# Patient Record
Sex: MALE | Race: WHITE | HISPANIC OR LATINO | Employment: UNEMPLOYED | URBAN - NONMETROPOLITAN AREA
[De-identification: names, ages, dates, MRNs, and addresses within clinical notes are randomized per-mention and may not be internally consistent; named-entity substitution may affect disease eponyms.]

---

## 2019-02-07 ENCOUNTER — NON-PROVIDER VISIT (OUTPATIENT)
Dept: URGENT CARE | Facility: PHYSICIAN GROUP | Age: 34
End: 2019-02-07
Payer: MEDICAID

## 2019-02-07 PROCEDURE — 8907 PR URINE COLLECT ONLY: Performed by: NURSE PRACTITIONER

## 2020-01-01 ENCOUNTER — APPOINTMENT (OUTPATIENT)
Dept: RADIOLOGY | Facility: MEDICAL CENTER | Age: 35
DRG: 432 | End: 2020-01-01
Attending: INTERNAL MEDICINE
Payer: MEDICAID

## 2020-01-01 ENCOUNTER — PATIENT OUTREACH (OUTPATIENT)
Dept: HEALTH INFORMATION MANAGEMENT | Facility: OTHER | Age: 35
End: 2020-01-01

## 2020-01-01 ENCOUNTER — OFFICE VISIT (OUTPATIENT)
Dept: URGENT CARE | Facility: PHYSICIAN GROUP | Age: 35
End: 2020-01-01
Payer: MEDICAID

## 2020-01-01 ENCOUNTER — APPOINTMENT (OUTPATIENT)
Dept: RADIOLOGY | Facility: MEDICAL CENTER | Age: 35
DRG: 432 | End: 2020-01-01
Attending: EMERGENCY MEDICINE
Payer: MEDICAID

## 2020-01-01 ENCOUNTER — HOSPITAL ENCOUNTER (OUTPATIENT)
Dept: RADIOLOGY | Facility: MEDICAL CENTER | Age: 35
End: 2020-10-19
Payer: MEDICAID

## 2020-01-01 ENCOUNTER — APPOINTMENT (OUTPATIENT)
Dept: RADIOLOGY | Facility: MEDICAL CENTER | Age: 35
DRG: 432 | End: 2020-01-01
Attending: STUDENT IN AN ORGANIZED HEALTH CARE EDUCATION/TRAINING PROGRAM
Payer: MEDICAID

## 2020-01-01 ENCOUNTER — HOSPITAL ENCOUNTER (INPATIENT)
Facility: MEDICAL CENTER | Age: 35
LOS: 1 days | DRG: 432 | End: 2020-10-06
Attending: EMERGENCY MEDICINE | Admitting: HOSPITALIST
Payer: MEDICAID

## 2020-01-01 ENCOUNTER — HOSPITAL ENCOUNTER (INPATIENT)
Facility: MEDICAL CENTER | Age: 35
LOS: 1 days | DRG: 432 | End: 2020-10-20
Attending: EMERGENCY MEDICINE | Admitting: INTERNAL MEDICINE
Payer: MEDICAID

## 2020-01-01 ENCOUNTER — APPOINTMENT (OUTPATIENT)
Dept: RADIOLOGY | Facility: MEDICAL CENTER | Age: 35
DRG: 432 | End: 2020-01-01
Payer: MEDICAID

## 2020-01-01 ENCOUNTER — HOSPITAL ENCOUNTER (OUTPATIENT)
Dept: RADIOLOGY | Facility: MEDICAL CENTER | Age: 35
End: 2020-10-20
Attending: INTERNAL MEDICINE
Payer: MEDICAID

## 2020-01-01 VITALS
TEMPERATURE: 98.3 F | WEIGHT: 213 LBS | BODY MASS INDEX: 31.55 KG/M2 | DIASTOLIC BLOOD PRESSURE: 95 MMHG | HEIGHT: 69 IN | HEART RATE: 106 BPM | SYSTOLIC BLOOD PRESSURE: 150 MMHG | OXYGEN SATURATION: 97 % | RESPIRATION RATE: 14 BRPM

## 2020-01-01 VITALS
DIASTOLIC BLOOD PRESSURE: 59 MMHG | SYSTOLIC BLOOD PRESSURE: 103 MMHG | WEIGHT: 272.49 LBS | OXYGEN SATURATION: 85 % | TEMPERATURE: 100 F | HEIGHT: 70 IN | RESPIRATION RATE: 26 BRPM | HEART RATE: 98 BPM | BODY MASS INDEX: 39.01 KG/M2

## 2020-01-01 VITALS
SYSTOLIC BLOOD PRESSURE: 139 MMHG | WEIGHT: 210.32 LBS | BODY MASS INDEX: 30.11 KG/M2 | HEART RATE: 124 BPM | OXYGEN SATURATION: 98 % | TEMPERATURE: 99.5 F | HEIGHT: 70 IN | RESPIRATION RATE: 18 BRPM | DIASTOLIC BLOOD PRESSURE: 93 MMHG

## 2020-01-01 DIAGNOSIS — R29.898 WEAKNESS OF BOTH LOWER EXTREMITIES: ICD-10-CM

## 2020-01-01 DIAGNOSIS — R03.0 ELEVATED BLOOD PRESSURE READING: ICD-10-CM

## 2020-01-01 DIAGNOSIS — K76.7 HEPATORENAL FAILURE (HCC): ICD-10-CM

## 2020-01-01 DIAGNOSIS — K92.2 UPPER GI BLEED: ICD-10-CM

## 2020-01-01 DIAGNOSIS — D62 ACUTE BLOOD LOSS ANEMIA: ICD-10-CM

## 2020-01-01 DIAGNOSIS — K70.30 ALCOHOLIC CIRRHOSIS OF LIVER WITHOUT ASCITES (HCC): ICD-10-CM

## 2020-01-01 DIAGNOSIS — R57.8 HEMORRHAGIC SHOCK (HCC): ICD-10-CM

## 2020-01-01 DIAGNOSIS — J96.00 ACUTE RESPIRATORY FAILURE, UNSPECIFIED WHETHER WITH HYPOXIA OR HYPERCAPNIA (HCC): ICD-10-CM

## 2020-01-01 DIAGNOSIS — K72.10 END STAGE LIVER DISEASE (HCC): ICD-10-CM

## 2020-01-01 DIAGNOSIS — R04.0 EPISTAXIS: ICD-10-CM

## 2020-01-01 LAB
ABO + RH BLD: NORMAL
ABO GROUP BLD: NORMAL
ACTION RANGE TRIGGERED IACRT: NO
ACTION RANGE TRIGGERED IACRT: YES
ALBUMIN SERPL BCP-MCNC: 1 G/DL (ref 3.2–4.9)
ALBUMIN SERPL BCP-MCNC: 1.3 G/DL (ref 3.2–4.9)
ALBUMIN SERPL BCP-MCNC: 1.6 G/DL (ref 3.2–4.9)
ALBUMIN SERPL BCP-MCNC: 2.2 G/DL (ref 3.2–4.9)
ALBUMIN SERPL BCP-MCNC: 2.5 G/DL (ref 3.2–4.9)
ALBUMIN/GLOB SERPL: 0.6 G/DL
ALBUMIN/GLOB SERPL: 0.6 G/DL
ALBUMIN/GLOB SERPL: 1.1 G/DL
ALBUMIN/GLOB SERPL: ABNORMAL G/DL
ALBUMIN/GLOB SERPL: ABNORMAL G/DL
ALP SERPL-CCNC: 105 U/L (ref 30–99)
ALP SERPL-CCNC: 105 U/L (ref 30–99)
ALP SERPL-CCNC: 120 U/L (ref 30–99)
ALP SERPL-CCNC: 153 U/L (ref 30–99)
ALP SERPL-CCNC: 83 U/L (ref 30–99)
ALT SERPL-CCNC: 118 U/L (ref 2–50)
ALT SERPL-CCNC: 22 U/L (ref 2–50)
ALT SERPL-CCNC: 26 U/L (ref 2–50)
ALT SERPL-CCNC: 2879 U/L (ref 2–50)
ALT SERPL-CCNC: 79 U/L (ref 2–50)
AMMONIA PLAS-SCNC: 562 UMOL/L (ref 11–45)
AMMONIA PLAS-SCNC: 74 UMOL/L (ref 11–45)
AMORPH CRY #/AREA URNS HPF: PRESENT /HPF
AMPHET UR QL SCN: NEGATIVE
ANION GAP SERPL CALC-SCNC: 11 MMOL/L (ref 7–16)
ANION GAP SERPL CALC-SCNC: 12 MMOL/L (ref 7–16)
ANION GAP SERPL CALC-SCNC: 14 MMOL/L (ref 7–16)
ANION GAP SERPL CALC-SCNC: 15 MMOL/L (ref 7–16)
ANION GAP SERPL CALC-SCNC: 16 MMOL/L (ref 7–16)
ANION GAP SERPL CALC-SCNC: 16 MMOL/L (ref 7–16)
ANION GAP SERPL CALC-SCNC: 21 MMOL/L (ref 7–16)
ANION GAP SERPL CALC-SCNC: 25 MMOL/L (ref 7–16)
ANISOCYTOSIS BLD QL SMEAR: ABNORMAL
APAP SERPL-MCNC: <5 UG/ML (ref 10–30)
APPEARANCE UR: ABNORMAL
APTT PPP: 63.6 SEC (ref 24.7–36)
APTT PPP: 94.4 SEC (ref 24.7–36)
AST SERPL-CCNC: 147 U/L (ref 12–45)
AST SERPL-CCNC: 169 U/L (ref 12–45)
AST SERPL-CCNC: 303 U/L (ref 12–45)
AST SERPL-CCNC: 469 U/L (ref 12–45)
AST SERPL-CCNC: >7000 U/L (ref 12–45)
BACTERIA #/AREA URNS HPF: ABNORMAL /HPF
BARBITURATES UR QL SCN: NEGATIVE
BARCODED ABORH UBTYP: 5100
BARCODED ABORH UBTYP: 6200
BARCODED ABORH UBTYP: 7300
BARCODED ABORH UBTYP: 8400
BARCODED PRD CODE UBPRD: NORMAL
BARCODED UNIT NUM UBUNT: NORMAL
BASE EXCESS BLDA CALC-SCNC: -13 MMOL/L (ref -4–3)
BASE EXCESS BLDA CALC-SCNC: -13 MMOL/L (ref -4–3)
BASE EXCESS BLDA CALC-SCNC: -15 MMOL/L (ref -4–3)
BASE EXCESS BLDA CALC-SCNC: -16 MMOL/L (ref -4–3)
BASE EXCESS BLDA CALC-SCNC: -17 MMOL/L (ref -4–3)
BASE EXCESS BLDA CALC-SCNC: -19 MMOL/L (ref -4–3)
BASE EXCESS BLDA CALC-SCNC: -20 MMOL/L (ref -4–3)
BASOPHILS # BLD AUTO: 0.4 % (ref 0–1.8)
BASOPHILS # BLD AUTO: 0.4 % (ref 0–1.8)
BASOPHILS # BLD AUTO: 0.9 % (ref 0–1.8)
BASOPHILS # BLD AUTO: 0.9 % (ref 0–1.8)
BASOPHILS # BLD AUTO: 1.8 % (ref 0–1.8)
BASOPHILS # BLD: 0.03 K/UL (ref 0–0.12)
BASOPHILS # BLD: 0.04 K/UL (ref 0–0.12)
BASOPHILS # BLD: 0.11 K/UL (ref 0–0.12)
BASOPHILS # BLD: 0.16 K/UL (ref 0–0.12)
BASOPHILS # BLD: 0.37 K/UL (ref 0–0.12)
BENZODIAZ UR QL SCN: NEGATIVE
BILIRUB CONJ SERPL-MCNC: 9.3 MG/DL (ref 0.1–0.5)
BILIRUB SERPL-MCNC: 12 MG/DL (ref 0.1–1.5)
BILIRUB SERPL-MCNC: 12.8 MG/DL (ref 0.1–1.5)
BILIRUB SERPL-MCNC: 13.2 MG/DL (ref 0.1–1.5)
BILIRUB SERPL-MCNC: 17 MG/DL (ref 0.1–1.5)
BILIRUB SERPL-MCNC: 17.2 MG/DL (ref 0.1–1.5)
BILIRUB UR QL STRIP.AUTO: ABNORMAL
BLD GP AB SCN SERPL QL: NORMAL
BLD GP AB SCN SERPL QL: NORMAL
BODY TEMPERATURE: ABNORMAL DEGREES
BUN SERPL-MCNC: 16 MG/DL (ref 8–22)
BUN SERPL-MCNC: 20 MG/DL (ref 8–22)
BUN SERPL-MCNC: 21 MG/DL (ref 8–22)
BUN SERPL-MCNC: 77 MG/DL (ref 8–22)
BUN SERPL-MCNC: 78 MG/DL (ref 8–22)
BUN SERPL-MCNC: 79 MG/DL (ref 8–22)
BUN SERPL-MCNC: 80 MG/DL (ref 8–22)
BUN SERPL-MCNC: 90 MG/DL (ref 8–22)
BURR CELLS BLD QL SMEAR: NORMAL
BZE UR QL SCN: NEGATIVE
CALCIUM SERPL-MCNC: 6 MG/DL (ref 8.5–10.5)
CALCIUM SERPL-MCNC: 6.3 MG/DL (ref 8.5–10.5)
CALCIUM SERPL-MCNC: 7.2 MG/DL (ref 8.5–10.5)
CALCIUM SERPL-MCNC: 7.2 MG/DL (ref 8.5–10.5)
CALCIUM SERPL-MCNC: 7.4 MG/DL (ref 8.5–10.5)
CALCIUM SERPL-MCNC: 7.4 MG/DL (ref 8.5–10.5)
CALCIUM SERPL-MCNC: 7.7 MG/DL (ref 8.5–10.5)
CALCIUM SERPL-MCNC: 8 MG/DL (ref 8.5–10.5)
CANNABINOIDS UR QL SCN: NEGATIVE
CFT BLD TEG: 8.1 MIN (ref 5–10)
CHLORIDE SERPL-SCNC: 101 MMOL/L (ref 96–112)
CHLORIDE SERPL-SCNC: 102 MMOL/L (ref 96–112)
CHLORIDE SERPL-SCNC: 104 MMOL/L (ref 96–112)
CHLORIDE SERPL-SCNC: 89 MMOL/L (ref 96–112)
CHLORIDE SERPL-SCNC: 92 MMOL/L (ref 96–112)
CHLORIDE SERPL-SCNC: 95 MMOL/L (ref 96–112)
CHLORIDE SERPL-SCNC: 96 MMOL/L (ref 96–112)
CHLORIDE SERPL-SCNC: 98 MMOL/L (ref 96–112)
CHLORIDE UR-SCNC: <20 MMOL/L
CHOLEST SERPL-MCNC: 82 MG/DL (ref 100–199)
CLOT ANGLE BLD TEG: 61.1 DEGREES (ref 53–72)
CLOT LYSIS 30M P MA LENFR BLD TEG: 0 % (ref 0–8)
CO2 BLDA-SCNC: 12 MMOL/L (ref 20–33)
CO2 BLDA-SCNC: 13 MMOL/L (ref 20–33)
CO2 BLDA-SCNC: 17 MMOL/L (ref 20–33)
CO2 BLDA-SCNC: 18 MMOL/L (ref 20–33)
CO2 BLDA-SCNC: <10 MMOL/L (ref 20–33)
CO2 BLDA-SCNC: <10 MMOL/L (ref 20–33)
CO2 SERPL-SCNC: 11 MMOL/L (ref 20–33)
CO2 SERPL-SCNC: 12 MMOL/L (ref 20–33)
CO2 SERPL-SCNC: 14 MMOL/L (ref 20–33)
CO2 SERPL-SCNC: 18 MMOL/L (ref 20–33)
CO2 SERPL-SCNC: 20 MMOL/L (ref 20–33)
CO2 SERPL-SCNC: 7 MMOL/L (ref 20–33)
COLOR UR: ABNORMAL
COMPONENT CT 8504CT: NORMAL
COMPONENT F 8504F: NORMAL
COMPONENT FT 8504FT: NORMAL
COMPONENT P 8504P: NORMAL
COMPONENT R 8504R: NORMAL
COVID ORDER STATUS COVID19: NORMAL
COVID ORDER STATUS COVID19: NORMAL
CREAT SERPL-MCNC: 0.4 MG/DL (ref 0.5–1.4)
CREAT SERPL-MCNC: 1.44 MG/DL (ref 0.5–1.4)
CREAT SERPL-MCNC: 1.97 MG/DL (ref 0.5–1.4)
CREAT SERPL-MCNC: 3.77 MG/DL (ref 0.5–1.4)
CREAT SERPL-MCNC: 4.04 MG/DL (ref 0.5–1.4)
CREAT SERPL-MCNC: 4.1 MG/DL (ref 0.5–1.4)
CREAT SERPL-MCNC: 4.24 MG/DL (ref 0.5–1.4)
CREAT SERPL-MCNC: 4.51 MG/DL (ref 0.5–1.4)
CREAT UR-MCNC: 237.22 MG/DL
CREAT UR-MCNC: 380.24 MG/DL
CT.EXTRINSIC BLD ROTEM: 2.2 MIN (ref 1–3)
EKG IMPRESSION: NORMAL
EOSINOPHIL # BLD AUTO: 0 K/UL (ref 0–0.51)
EOSINOPHIL # BLD AUTO: 0.02 K/UL (ref 0–0.51)
EOSINOPHIL # BLD AUTO: 0.02 K/UL (ref 0–0.51)
EOSINOPHIL # BLD AUTO: 0.11 K/UL (ref 0–0.51)
EOSINOPHIL # BLD AUTO: 0.3 K/UL (ref 0–0.51)
EOSINOPHIL NFR BLD: 0 % (ref 0–6.9)
EOSINOPHIL NFR BLD: 0.2 % (ref 0–6.9)
EOSINOPHIL NFR BLD: 0.2 % (ref 0–6.9)
EOSINOPHIL NFR BLD: 0.9 % (ref 0–6.9)
EOSINOPHIL NFR BLD: 1.7 % (ref 0–6.9)
EPI CELLS #/AREA URNS HPF: NEGATIVE /HPF
ERYTHROCYTE [DISTWIDTH] IN BLOOD BY AUTOMATED COUNT: 45.6 FL (ref 35.9–50)
ERYTHROCYTE [DISTWIDTH] IN BLOOD BY AUTOMATED COUNT: 46.1 FL (ref 35.9–50)
ERYTHROCYTE [DISTWIDTH] IN BLOOD BY AUTOMATED COUNT: 46.4 FL (ref 35.9–50)
ERYTHROCYTE [DISTWIDTH] IN BLOOD BY AUTOMATED COUNT: 48.2 FL (ref 35.9–50)
ERYTHROCYTE [DISTWIDTH] IN BLOOD BY AUTOMATED COUNT: 48.9 FL (ref 35.9–50)
ERYTHROCYTE [DISTWIDTH] IN BLOOD BY AUTOMATED COUNT: 51.3 FL (ref 35.9–50)
ERYTHROCYTE [DISTWIDTH] IN BLOOD BY AUTOMATED COUNT: 51.6 FL (ref 35.9–50)
ERYTHROCYTE [DISTWIDTH] IN BLOOD BY AUTOMATED COUNT: 57.6 FL (ref 35.9–50)
ERYTHROCYTE [DISTWIDTH] IN BLOOD BY AUTOMATED COUNT: 57.8 FL (ref 35.9–50)
ERYTHROCYTE [DISTWIDTH] IN BLOOD BY AUTOMATED COUNT: 61 FL (ref 35.9–50)
ERYTHROCYTE [DISTWIDTH] IN BLOOD BY AUTOMATED COUNT: 65.1 FL (ref 35.9–50)
ETHANOL BLD-MCNC: 327.6 MG/DL (ref 0–10.1)
FERRITIN SERPL-MCNC: 354 NG/ML (ref 22–322)
FIBRINOGEN PPP-MCNC: 126 MG/DL (ref 215–460)
FIBRINOGEN PPP-MCNC: 142 MG/DL (ref 215–460)
FOLATE SERPL-MCNC: <2 NG/ML
GLOBULIN SER CALC-MCNC: 1.4 G/DL (ref 1.9–3.5)
GLOBULIN SER CALC-MCNC: 3.9 G/DL (ref 1.9–3.5)
GLOBULIN SER CALC-MCNC: 4.5 G/DL (ref 1.9–3.5)
GLOBULIN SER CALC-MCNC: ABNORMAL G/DL (ref 1.9–3.5)
GLOBULIN SER CALC-MCNC: ABNORMAL G/DL (ref 1.9–3.5)
GLUCOSE BLD-MCNC: 103 MG/DL (ref 65–99)
GLUCOSE BLD-MCNC: 118 MG/DL (ref 65–99)
GLUCOSE BLD-MCNC: 199 MG/DL (ref 65–99)
GLUCOSE BLD-MCNC: 27 MG/DL (ref 65–99)
GLUCOSE BLD-MCNC: 29 MG/DL (ref 65–99)
GLUCOSE BLD-MCNC: 53 MG/DL (ref 65–99)
GLUCOSE BLD-MCNC: 56 MG/DL (ref 65–99)
GLUCOSE BLD-MCNC: 57 MG/DL (ref 65–99)
GLUCOSE BLD-MCNC: 62 MG/DL (ref 65–99)
GLUCOSE BLD-MCNC: 71 MG/DL (ref 65–99)
GLUCOSE BLD-MCNC: 78 MG/DL (ref 65–99)
GLUCOSE BLD-MCNC: 91 MG/DL (ref 65–99)
GLUCOSE BLD-MCNC: 93 MG/DL (ref 65–99)
GLUCOSE SERPL-MCNC: 105 MG/DL (ref 65–99)
GLUCOSE SERPL-MCNC: 114 MG/DL (ref 65–99)
GLUCOSE SERPL-MCNC: 119 MG/DL (ref 65–99)
GLUCOSE SERPL-MCNC: 237 MG/DL (ref 65–99)
GLUCOSE SERPL-MCNC: 62 MG/DL (ref 65–99)
GLUCOSE SERPL-MCNC: 75 MG/DL (ref 65–99)
GLUCOSE SERPL-MCNC: 79 MG/DL (ref 65–99)
GLUCOSE SERPL-MCNC: 80 MG/DL (ref 65–99)
GLUCOSE UR STRIP.AUTO-MCNC: 100 MG/DL
HAPTOGLOB SERPL-MCNC: 13 MG/DL (ref 30–200)
HAV IGM SERPL QL IA: NORMAL
HBV CORE IGM SER QL: NORMAL
HBV SURFACE AG SER QL: NORMAL
HCO3 BLDA-SCNC: 11.7 MMOL/L (ref 17–25)
HCO3 BLDA-SCNC: 12.1 MMOL/L (ref 17–25)
HCO3 BLDA-SCNC: 15.3 MMOL/L (ref 17–25)
HCO3 BLDA-SCNC: 15.4 MMOL/L (ref 17–25)
HCO3 BLDA-SCNC: 8.4 MMOL/L (ref 17–25)
HCO3 BLDA-SCNC: 8.7 MMOL/L (ref 17–25)
HCT VFR BLD AUTO: 16.1 % (ref 42–52)
HCT VFR BLD AUTO: 18.3 % (ref 42–52)
HCT VFR BLD AUTO: 19.4 % (ref 42–52)
HCT VFR BLD AUTO: 20.6 % (ref 42–52)
HCT VFR BLD AUTO: 21.8 % (ref 42–52)
HCT VFR BLD AUTO: 21.8 % (ref 42–52)
HCT VFR BLD AUTO: 22.7 % (ref 42–52)
HCT VFR BLD AUTO: 23.6 % (ref 42–52)
HCT VFR BLD AUTO: 24.4 % (ref 42–52)
HCT VFR BLD AUTO: 26.4 % (ref 42–52)
HCT VFR BLD AUTO: 29.2 % (ref 42–52)
HCV AB SER QL: NORMAL
HDLC SERPL-MCNC: 8 MG/DL
HGB BLD-MCNC: 10.2 G/DL (ref 14–18)
HGB BLD-MCNC: 5.4 G/DL (ref 14–18)
HGB BLD-MCNC: 5.6 G/DL (ref 14–18)
HGB BLD-MCNC: 6 G/DL (ref 14–18)
HGB BLD-MCNC: 7 G/DL (ref 14–18)
HGB BLD-MCNC: 7.3 G/DL (ref 14–18)
HGB BLD-MCNC: 7.7 G/DL (ref 14–18)
HGB BLD-MCNC: 8 G/DL (ref 14–18)
HGB BLD-MCNC: 8.2 G/DL (ref 14–18)
HGB BLD-MCNC: 8.3 G/DL (ref 14–18)
HGB BLD-MCNC: 9.1 G/DL (ref 14–18)
HGB RETIC QN AUTO: 35.8 PG/CELL (ref 29–35)
HIV 1+2 AB+HIV1 P24 AG SERPL QL IA: NORMAL
HOROWITZ INDEX BLDA+IHG-RTO: 120 MM[HG]
HOROWITZ INDEX BLDA+IHG-RTO: 137 MM[HG]
HOROWITZ INDEX BLDA+IHG-RTO: 48 MM[HG]
HOROWITZ INDEX BLDA+IHG-RTO: 55 MM[HG]
HOROWITZ INDEX BLDA+IHG-RTO: 62 MM[HG]
HOROWITZ INDEX BLDA+IHG-RTO: 67 MM[HG]
HOROWITZ INDEX BLDA+IHG-RTO: 92 MM[HG]
HYALINE CASTS #/AREA URNS LPF: >20 /LPF
HYPOCHROMIA BLD QL SMEAR: ABNORMAL
IMM GRANULOCYTES # BLD AUTO: 0.09 K/UL (ref 0–0.11)
IMM GRANULOCYTES # BLD AUTO: 0.14 K/UL (ref 0–0.11)
IMM GRANULOCYTES NFR BLD AUTO: 1.1 % (ref 0–0.9)
IMM GRANULOCYTES NFR BLD AUTO: 1.3 % (ref 0–0.9)
IMM RETICS NFR: 14.4 % (ref 9.3–17.4)
INR PPP: 1.38 (ref 0.87–1.13)
INR PPP: 1.63 (ref 0.87–1.13)
INR PPP: 2.51 (ref 0.87–1.13)
INR PPP: 2.92 (ref 0.87–1.13)
INR PPP: 2.99 (ref 0.87–1.13)
INST. QUALIFIED PATIENT IIQPT: YES
IRON SATN MFR SERPL: 44 % (ref 15–55)
IRON SERPL-MCNC: 55 UG/DL (ref 50–180)
KETONES UR STRIP.AUTO-MCNC: ABNORMAL MG/DL
LACTATE BLD-SCNC: 5 MMOL/L (ref 0.5–2)
LACTATE BLD-SCNC: 5.1 MMOL/L (ref 0.5–2)
LACTATE BLD-SCNC: 7.7 MMOL/L (ref 0.5–2)
LDH SERPL L TO P-CCNC: 363 U/L (ref 107–266)
LDLC SERPL CALC-MCNC: 46 MG/DL
LEUKOCYTE ESTERASE UR QL STRIP.AUTO: ABNORMAL
LIPASE SERPL-CCNC: 577 U/L (ref 11–82)
LIPASE SERPL-CCNC: 691 U/L (ref 11–82)
LYMPHOCYTES # BLD AUTO: 1.67 K/UL (ref 1–4.8)
LYMPHOCYTES # BLD AUTO: 1.94 K/UL (ref 1–4.8)
LYMPHOCYTES # BLD AUTO: 1.95 K/UL (ref 1–4.8)
LYMPHOCYTES # BLD AUTO: 2.48 K/UL (ref 1–4.8)
LYMPHOCYTES # BLD AUTO: 3.71 K/UL (ref 1–4.8)
LYMPHOCYTES NFR BLD: 11.1 % (ref 22–41)
LYMPHOCYTES NFR BLD: 18.1 % (ref 22–41)
LYMPHOCYTES NFR BLD: 18.3 % (ref 22–41)
LYMPHOCYTES NFR BLD: 20 % (ref 22–41)
LYMPHOCYTES NFR BLD: 20.8 % (ref 22–41)
MACROCYTES BLD QL SMEAR: ABNORMAL
MAGNESIUM SERPL-MCNC: 1.9 MG/DL (ref 1.5–2.5)
MAGNESIUM SERPL-MCNC: 2.1 MG/DL (ref 1.5–2.5)
MAGNESIUM SERPL-MCNC: 2.2 MG/DL (ref 1.5–2.5)
MANUAL DIFF BLD: ABNORMAL
MANUAL DIFF BLD: NORMAL
MANUAL DIFF BLD: NORMAL
MCF BLD TEG: 55.1 MM (ref 50–70)
MCH RBC QN AUTO: 30.6 PG (ref 27–33)
MCH RBC QN AUTO: 30.6 PG (ref 27–33)
MCH RBC QN AUTO: 30.9 PG (ref 27–33)
MCH RBC QN AUTO: 31.2 PG (ref 27–33)
MCH RBC QN AUTO: 31.3 PG (ref 27–33)
MCH RBC QN AUTO: 31.4 PG (ref 27–33)
MCH RBC QN AUTO: 31.5 PG (ref 27–33)
MCH RBC QN AUTO: 31.5 PG (ref 27–33)
MCH RBC QN AUTO: 31.6 PG (ref 27–33)
MCH RBC QN AUTO: 31.8 PG (ref 27–33)
MCH RBC QN AUTO: 32 PG (ref 27–33)
MCHC RBC AUTO-ENTMCNC: 30.6 G/DL (ref 33.7–35.3)
MCHC RBC AUTO-ENTMCNC: 30.9 G/DL (ref 33.7–35.3)
MCHC RBC AUTO-ENTMCNC: 32.1 G/DL (ref 33.7–35.3)
MCHC RBC AUTO-ENTMCNC: 32.7 G/DL (ref 33.7–35.3)
MCHC RBC AUTO-ENTMCNC: 32.8 G/DL (ref 33.7–35.3)
MCHC RBC AUTO-ENTMCNC: 34.5 G/DL (ref 33.7–35.3)
MCHC RBC AUTO-ENTMCNC: 34.9 G/DL (ref 33.7–35.3)
MCHC RBC AUTO-ENTMCNC: 35.2 G/DL (ref 33.7–35.3)
MCHC RBC AUTO-ENTMCNC: 35.3 G/DL (ref 33.7–35.3)
MCHC RBC AUTO-ENTMCNC: 35.4 G/DL (ref 33.7–35.3)
MCHC RBC AUTO-ENTMCNC: 36.1 G/DL (ref 33.7–35.3)
MCV RBC AUTO: 102.2 FL (ref 81.4–97.8)
MCV RBC AUTO: 88 FL (ref 81.4–97.8)
MCV RBC AUTO: 88.8 FL (ref 81.4–97.8)
MCV RBC AUTO: 89 FL (ref 81.4–97.8)
MCV RBC AUTO: 89.7 FL (ref 81.4–97.8)
MCV RBC AUTO: 91.3 FL (ref 81.4–97.8)
MCV RBC AUTO: 91.5 FL (ref 81.4–97.8)
MCV RBC AUTO: 94.2 FL (ref 81.4–97.8)
MCV RBC AUTO: 95.2 FL (ref 81.4–97.8)
MCV RBC AUTO: 95.4 FL (ref 81.4–97.8)
MCV RBC AUTO: 99 FL (ref 81.4–97.8)
METAMYELOCYTES NFR BLD MANUAL: 0.9 %
METHADONE UR QL SCN: NEGATIVE
MICRO URNS: ABNORMAL
MICROCYTES BLD QL SMEAR: ABNORMAL
MONOCYTES # BLD AUTO: 0.43 K/UL (ref 0–0.85)
MONOCYTES # BLD AUTO: 0.57 K/UL (ref 0–0.85)
MONOCYTES # BLD AUTO: 1.08 K/UL (ref 0–0.85)
MONOCYTES # BLD AUTO: 1.77 K/UL (ref 0–0.85)
MONOCYTES # BLD AUTO: 1.95 K/UL (ref 0–0.85)
MONOCYTES NFR BLD AUTO: 11.1 % (ref 0–13.4)
MONOCYTES NFR BLD AUTO: 13.5 % (ref 0–13.4)
MONOCYTES NFR BLD AUTO: 16.5 % (ref 0–13.4)
MONOCYTES NFR BLD AUTO: 2.8 % (ref 0–13.4)
MONOCYTES NFR BLD AUTO: 3.5 % (ref 0–13.4)
MORPHOLOGY BLD-IMP: NORMAL
MYELOCYTES NFR BLD MANUAL: 0.9 %
MYELOCYTES NFR BLD MANUAL: 3.5 %
MYELOCYTES NFR BLD MANUAL: 6 %
NEUTROPHILS # BLD AUTO: 12.04 K/UL (ref 1.82–7.42)
NEUTROPHILS # BLD AUTO: 15.27 K/UL (ref 1.82–7.42)
NEUTROPHILS # BLD AUTO: 5.12 K/UL (ref 1.82–7.42)
NEUTROPHILS # BLD AUTO: 6.82 K/UL (ref 1.82–7.42)
NEUTROPHILS # BLD AUTO: 8.73 K/UL (ref 1.82–7.42)
NEUTROPHILS NFR BLD: 62.4 % (ref 44–72)
NEUTROPHILS NFR BLD: 63.5 % (ref 44–72)
NEUTROPHILS NFR BLD: 64 % (ref 44–72)
NEUTROPHILS NFR BLD: 64.2 % (ref 44–72)
NEUTROPHILS NFR BLD: 64.3 % (ref 44–72)
NEUTS BAND NFR BLD MANUAL: 11 % (ref 0–10)
NEUTS BAND NFR BLD MANUAL: 6 % (ref 0–10)
NEUTS BAND NFR BLD MANUAL: 6.1 % (ref 0–10)
NITRITE UR QL STRIP.AUTO: POSITIVE
NRBC # BLD AUTO: 0.02 K/UL
NRBC # BLD AUTO: 0.03 K/UL
NRBC # BLD AUTO: 0.05 K/UL
NRBC # BLD AUTO: 0.08 K/UL
NRBC # BLD AUTO: 0.37 K/UL
NRBC BLD-RTO: 0.2 /100 WBC
NRBC BLD-RTO: 0.3 /100 WBC
NRBC BLD-RTO: 0.3 /100 WBC
NRBC BLD-RTO: 0.6 /100 WBC
NRBC BLD-RTO: 1.8 /100 WBC
O2/TOTAL GAS SETTING VFR VENT: 100 %
O2/TOTAL GAS SETTING VFR VENT: 50 %
O2/TOTAL GAS SETTING VFR VENT: 60 %
O2/TOTAL GAS SETTING VFR VENT: 60 %
OPIATES UR QL SCN: NEGATIVE
OSMOLALITY SERPL: 351 MOSM/KG H2O (ref 278–298)
OSMOLALITY UR: 608 MOSM/KG H2O (ref 300–900)
OXYCODONE UR QL SCN: NEGATIVE
PA AA BLD-ACNC: 80 %
PA ADP BLD-ACNC: 62.2 %
PCO2 BLDA: 23 MMHG (ref 26–37)
PCO2 BLDA: 27 MMHG (ref 26–37)
PCO2 BLDA: 27.1 MMHG (ref 26–37)
PCO2 BLDA: 41.6 MMHG (ref 26–37)
PCO2 BLDA: 46.7 MMHG (ref 26–37)
PCO2 BLDA: 51.6 MMHG (ref 26–37)
PCO2 BLDA: 70.7 MMHG (ref 26–37)
PCO2 TEMP ADJ BLDA: 19.1 MMHG (ref 26–37)
PCO2 TEMP ADJ BLDA: 19.2 MMHG (ref 26–37)
PCO2 TEMP ADJ BLDA: 22.9 MMHG (ref 26–37)
PCO2 TEMP ADJ BLDA: 38.2 MMHG (ref 26–37)
PCO2 TEMP ADJ BLDA: 38.4 MMHG (ref 26–37)
PCO2 TEMP ADJ BLDA: 54.2 MMHG (ref 26–37)
PCP UR QL SCN: NEGATIVE
PH BLDA: 6.95 [PH] (ref 7.4–7.5)
PH BLDA: 6.98 [PH] (ref 7.4–7.5)
PH BLDA: 7.07 [PH] (ref 7.4–7.5)
PH BLDA: 7.1 [PH] (ref 7.4–7.5)
PH BLDA: 7.11 [PH] (ref 7.4–7.5)
PH BLDA: 7.12 [PH] (ref 7.4–7.5)
PH BLDA: 7.31 [PH] (ref 7.4–7.5)
PH TEMP ADJ BLDA: 7.02 [PH] (ref 7.4–7.5)
PH TEMP ADJ BLDA: 7.1 [PH] (ref 7.4–7.5)
PH TEMP ADJ BLDA: 7.18 [PH] (ref 7.4–7.5)
PH TEMP ADJ BLDA: 7.2 [PH] (ref 7.4–7.5)
PH TEMP ADJ BLDA: 7.21 [PH] (ref 7.4–7.5)
PH TEMP ADJ BLDA: 7.32 [PH] (ref 7.4–7.5)
PH UR STRIP.AUTO: 6.5 [PH] (ref 5–8)
PHOSPHATE SERPL-MCNC: 2.2 MG/DL (ref 2.5–4.5)
PHOSPHATE SERPL-MCNC: 3.3 MG/DL (ref 2.5–4.5)
PHOSPHATE SERPL-MCNC: 7.9 MG/DL (ref 2.5–4.5)
PLATELET # BLD AUTO: 101 K/UL (ref 164–446)
PLATELET # BLD AUTO: 107 K/UL (ref 164–446)
PLATELET # BLD AUTO: 110 K/UL (ref 164–446)
PLATELET # BLD AUTO: 112 K/UL (ref 164–446)
PLATELET # BLD AUTO: 143 K/UL (ref 164–446)
PLATELET # BLD AUTO: 150 K/UL (ref 164–446)
PLATELET # BLD AUTO: 222 K/UL (ref 164–446)
PLATELET # BLD AUTO: 68 K/UL (ref 164–446)
PLATELET # BLD AUTO: 70 K/UL (ref 164–446)
PLATELET # BLD AUTO: 90 K/UL (ref 164–446)
PLATELET # BLD AUTO: 95 K/UL (ref 164–446)
PLATELET BLD QL SMEAR: NORMAL
PMV BLD AUTO: 10.1 FL (ref 9–12.9)
PMV BLD AUTO: 10.1 FL (ref 9–12.9)
PMV BLD AUTO: 10.6 FL (ref 9–12.9)
PMV BLD AUTO: 10.8 FL (ref 9–12.9)
PMV BLD AUTO: 10.8 FL (ref 9–12.9)
PMV BLD AUTO: 11.2 FL (ref 9–12.9)
PMV BLD AUTO: 9.7 FL (ref 9–12.9)
PMV BLD AUTO: 9.8 FL (ref 9–12.9)
PMV BLD AUTO: 9.8 FL (ref 9–12.9)
PO2 BLDA: 48 MMHG (ref 64–87)
PO2 BLDA: 55 MMHG (ref 64–87)
PO2 BLDA: 55 MMHG (ref 64–87)
PO2 BLDA: 60 MMHG (ref 64–87)
PO2 BLDA: 62 MMHG (ref 64–87)
PO2 BLDA: 67 MMHG (ref 64–87)
PO2 BLDA: 82 MMHG (ref 64–87)
PO2 TEMP ADJ BLDA: 35 MMHG (ref 64–87)
PO2 TEMP ADJ BLDA: 35 MMHG (ref 64–87)
PO2 TEMP ADJ BLDA: 45 MMHG (ref 64–87)
PO2 TEMP ADJ BLDA: 48 MMHG (ref 64–87)
PO2 TEMP ADJ BLDA: 49 MMHG (ref 64–87)
PO2 TEMP ADJ BLDA: 55 MMHG (ref 64–87)
POIKILOCYTOSIS BLD QL SMEAR: NORMAL
POLYCHROMASIA BLD QL SMEAR: NORMAL
POTASSIUM SERPL-SCNC: 3.4 MMOL/L (ref 3.6–5.5)
POTASSIUM SERPL-SCNC: 3.4 MMOL/L (ref 3.6–5.5)
POTASSIUM SERPL-SCNC: 3.7 MMOL/L (ref 3.6–5.5)
POTASSIUM SERPL-SCNC: 4.8 MMOL/L (ref 3.6–5.5)
POTASSIUM SERPL-SCNC: 5.3 MMOL/L (ref 3.6–5.5)
POTASSIUM SERPL-SCNC: 5.3 MMOL/L (ref 3.6–5.5)
POTASSIUM SERPL-SCNC: 5.4 MMOL/L (ref 3.6–5.5)
POTASSIUM SERPL-SCNC: 5.6 MMOL/L (ref 3.6–5.5)
POTASSIUM UR-SCNC: 98 MMOL/L
PRODUCT TYPE UPROD: NORMAL
PROPOXYPH UR QL SCN: NEGATIVE
PROT SERPL-MCNC: 3 G/DL (ref 6–8.2)
PROT SERPL-MCNC: 3 G/DL (ref 6–8.2)
PROT SERPL-MCNC: 3.6 G/DL (ref 6–8.2)
PROT SERPL-MCNC: 6.1 G/DL (ref 6–8.2)
PROT SERPL-MCNC: 7 G/DL (ref 6–8.2)
PROT UR QL STRIP: 30 MG/DL
PROT UR-MCNC: 81 MG/DL (ref 0–15)
PROTHROMBIN TIME: 17.4 SEC (ref 12–14.6)
PROTHROMBIN TIME: 19.9 SEC (ref 12–14.6)
PROTHROMBIN TIME: 27.8 SEC (ref 12–14.6)
PROTHROMBIN TIME: 31.4 SEC (ref 12–14.6)
PROTHROMBIN TIME: 32 SEC (ref 12–14.6)
RBC # BLD AUTO: 1.73 M/UL (ref 4.7–6.1)
RBC # BLD AUTO: 1.79 M/UL (ref 4.7–6.1)
RBC # BLD AUTO: 1.96 M/UL (ref 4.7–6.1)
RBC # BLD AUTO: 2.29 M/UL (ref 4.7–6.1)
RBC # BLD AUTO: 2.32 M/UL (ref 4.7–6.1)
RBC # BLD AUTO: 2.45 M/UL (ref 4.7–6.1)
RBC # BLD AUTO: 2.58 M/UL (ref 4.7–6.1)
RBC # BLD AUTO: 2.59 M/UL (ref 4.7–6.1)
RBC # BLD AUTO: 2.63 M/UL (ref 4.7–6.1)
RBC # BLD AUTO: 2.89 M/UL (ref 4.7–6.1)
RBC # BLD AUTO: 3.19 M/UL (ref 4.7–6.1)
RBC # URNS HPF: ABNORMAL /HPF
RBC BLD AUTO: PRESENT
RBC UR QL AUTO: ABNORMAL
RETICS # AUTO: 0.11 M/UL (ref 0.04–0.06)
RETICS/RBC NFR: 3.4 % (ref 0.8–2.1)
RH BLD: NORMAL
SALICYLATES SERPL-MCNC: <1 MG/DL (ref 15–25)
SAO2 % BLDA: 70 % (ref 93–99)
SAO2 % BLDA: 75 % (ref 93–99)
SAO2 % BLDA: 76 % (ref 93–99)
SAO2 % BLDA: 78 % (ref 93–99)
SAO2 % BLDA: 81 % (ref 93–99)
SAO2 % BLDA: 87 % (ref 93–99)
SAO2 % BLDA: 91 % (ref 93–99)
SARS-COV+SARS-COV-2 AG RESP QL IA.RAPID: NOTDETECTED
SARS-COV-2 RNA RESP QL NAA+PROBE: NOTDETECTED
SCHISTOCYTES BLD QL SMEAR: NORMAL
SODIUM SERPL-SCNC: 120 MMOL/L (ref 135–145)
SODIUM SERPL-SCNC: 123 MMOL/L (ref 135–145)
SODIUM SERPL-SCNC: 124 MMOL/L (ref 135–145)
SODIUM SERPL-SCNC: 126 MMOL/L (ref 135–145)
SODIUM SERPL-SCNC: 127 MMOL/L (ref 135–145)
SODIUM SERPL-SCNC: 135 MMOL/L (ref 135–145)
SODIUM SERPL-SCNC: 136 MMOL/L (ref 135–145)
SODIUM SERPL-SCNC: 138 MMOL/L (ref 135–145)
SODIUM UR-SCNC: 23 MMOL/L
SODIUM UR-SCNC: <20 MMOL/L
SODIUM UR-SCNC: <20 MMOL/L
SP GR UR STRIP.AUTO: 1.02
SPECIMEN DRAWN FROM PATIENT: ABNORMAL
SPECIMEN SOURCE: NORMAL
SPECIMEN SOURCE: NORMAL
TEG ALGORITHM TGALG: NORMAL
TIBC SERPL-MCNC: 125 UG/DL (ref 250–450)
TRIGL SERPL-MCNC: 141 MG/DL (ref 0–149)
TROPONIN T SERPL-MCNC: 22 NG/L (ref 6–19)
UIBC SERPL-MCNC: 70 UG/DL (ref 110–370)
UNIT STATUS USTAT: NORMAL
UROBILINOGEN UR STRIP.AUTO-MCNC: 4 MG/DL
VIT B12 SERPL-MCNC: 971 PG/ML (ref 211–911)
WBC # BLD AUTO: 10.4 K/UL (ref 4.8–10.8)
WBC # BLD AUTO: 10.7 K/UL (ref 4.8–10.8)
WBC # BLD AUTO: 11.1 K/UL (ref 4.8–10.8)
WBC # BLD AUTO: 12.4 K/UL (ref 4.8–10.8)
WBC # BLD AUTO: 13.3 K/UL (ref 4.8–10.8)
WBC # BLD AUTO: 17.6 K/UL (ref 4.8–10.8)
WBC # BLD AUTO: 20.3 K/UL (ref 4.8–10.8)
WBC # BLD AUTO: 28 K/UL (ref 4.8–10.8)
WBC # BLD AUTO: 33.1 K/UL (ref 4.8–10.8)
WBC # BLD AUTO: 8 K/UL (ref 4.8–10.8)
WBC # BLD AUTO: 9.7 K/UL (ref 4.8–10.8)
WBC #/AREA URNS HPF: ABNORMAL /HPF

## 2020-01-01 PROCEDURE — 700105 HCHG RX REV CODE 258: Performed by: EMERGENCY MEDICINE

## 2020-01-01 PROCEDURE — 86901 BLOOD TYPING SEROLOGIC RH(D): CPT

## 2020-01-01 PROCEDURE — 83690 ASSAY OF LIPASE: CPT

## 2020-01-01 PROCEDURE — P9012 CRYOPRECIPITATE EACH UNIT: HCPCS | Mod: 91

## 2020-01-01 PROCEDURE — 36556 INSERT NON-TUNNEL CV CATH: CPT

## 2020-01-01 PROCEDURE — A9270 NON-COVERED ITEM OR SERVICE: HCPCS | Performed by: STUDENT IN AN ORGANIZED HEALTH CARE EDUCATION/TRAINING PROGRAM

## 2020-01-01 PROCEDURE — 700102 HCHG RX REV CODE 250 W/ 637 OVERRIDE(OP): Performed by: STUDENT IN AN ORGANIZED HEALTH CARE EDUCATION/TRAINING PROGRAM

## 2020-01-01 PROCEDURE — 30243N1 TRANSFUSION OF NONAUTOLOGOUS RED BLOOD CELLS INTO CENTRAL VEIN, PERCUTANEOUS APPROACH: ICD-10-PCS | Performed by: INTERNAL MEDICINE

## 2020-01-01 PROCEDURE — 82140 ASSAY OF AMMONIA: CPT

## 2020-01-01 PROCEDURE — 700105 HCHG RX REV CODE 258: Performed by: STUDENT IN AN ORGANIZED HEALTH CARE EDUCATION/TRAINING PROGRAM

## 2020-01-01 PROCEDURE — 84100 ASSAY OF PHOSPHORUS: CPT

## 2020-01-01 PROCEDURE — 700111 HCHG RX REV CODE 636 W/ 250 OVERRIDE (IP): Performed by: STUDENT IN AN ORGANIZED HEALTH CARE EDUCATION/TRAINING PROGRAM

## 2020-01-01 PROCEDURE — 83735 ASSAY OF MAGNESIUM: CPT

## 2020-01-01 PROCEDURE — 36415 COLL VENOUS BLD VENIPUNCTURE: CPT

## 2020-01-01 PROCEDURE — 96365 THER/PROPH/DIAG IV INF INIT: CPT

## 2020-01-01 PROCEDURE — 97116 GAIT TRAINING THERAPY: CPT

## 2020-01-01 PROCEDURE — 85610 PROTHROMBIN TIME: CPT

## 2020-01-01 PROCEDURE — 85730 THROMBOPLASTIN TIME PARTIAL: CPT

## 2020-01-01 PROCEDURE — 80053 COMPREHEN METABOLIC PANEL: CPT | Mod: 91

## 2020-01-01 PROCEDURE — C9113 INJ PANTOPRAZOLE SODIUM, VIA: HCPCS | Performed by: INTERNAL MEDICINE

## 2020-01-01 PROCEDURE — 700105 HCHG RX REV CODE 258: Performed by: INTERNAL MEDICINE

## 2020-01-01 PROCEDURE — 502240 HCHG MISC OR SUPPLY RC 0272: Performed by: INTERNAL MEDICINE

## 2020-01-01 PROCEDURE — C9803 HOPD COVID-19 SPEC COLLECT: HCPCS | Performed by: EMERGENCY MEDICINE

## 2020-01-01 PROCEDURE — 700111 HCHG RX REV CODE 636 W/ 250 OVERRIDE (IP): Performed by: INTERNAL MEDICINE

## 2020-01-01 PROCEDURE — 71045 X-RAY EXAM CHEST 1 VIEW: CPT

## 2020-01-01 PROCEDURE — 04HL33Z INSERTION OF INFUSION DEVICE INTO LEFT FEMORAL ARTERY, PERCUTANEOUS APPROACH: ICD-10-PCS | Performed by: INTERNAL MEDICINE

## 2020-01-01 PROCEDURE — 0D9670Z DRAINAGE OF STOMACH WITH DRAINAGE DEVICE, VIA NATURAL OR ARTIFICIAL OPENING: ICD-10-PCS | Performed by: INTERNAL MEDICINE

## 2020-01-01 PROCEDURE — 81001 URINALYSIS AUTO W/SCOPE: CPT

## 2020-01-01 PROCEDURE — 700111 HCHG RX REV CODE 636 W/ 250 OVERRIDE (IP)

## 2020-01-01 PROCEDURE — 80074 ACUTE HEPATITIS PANEL: CPT

## 2020-01-01 PROCEDURE — 700101 HCHG RX REV CODE 250: Performed by: PSYCHIATRY & NEUROLOGY

## 2020-01-01 PROCEDURE — 84484 ASSAY OF TROPONIN QUANT: CPT

## 2020-01-01 PROCEDURE — P9047 ALBUMIN (HUMAN), 25%, 50ML: HCPCS | Performed by: INTERNAL MEDICINE

## 2020-01-01 PROCEDURE — 85384 FIBRINOGEN ACTIVITY: CPT

## 2020-01-01 PROCEDURE — 770020 HCHG ROOM/CARE - TELE (206)

## 2020-01-01 PROCEDURE — 97165 OT EVAL LOW COMPLEX 30 MIN: CPT

## 2020-01-01 PROCEDURE — 83540 ASSAY OF IRON: CPT

## 2020-01-01 PROCEDURE — 30243M1 TRANSFUSION OF NONAUTOLOGOUS PLASMA CRYOPRECIPITATE INTO CENTRAL VEIN, PERCUTANEOUS APPROACH: ICD-10-PCS | Performed by: INTERNAL MEDICINE

## 2020-01-01 PROCEDURE — 99292 CRITICAL CARE ADDL 30 MIN: CPT | Mod: 25 | Performed by: INTERNAL MEDICINE

## 2020-01-01 PROCEDURE — 31645 BRNCHSC W/THER ASPIR 1ST: CPT | Performed by: INTERNAL MEDICINE

## 2020-01-01 PROCEDURE — 82436 ASSAY OF URINE CHLORIDE: CPT

## 2020-01-01 PROCEDURE — 99291 CRITICAL CARE FIRST HOUR: CPT | Mod: 25 | Performed by: INTERNAL MEDICINE

## 2020-01-01 PROCEDURE — 94760 N-INVAS EAR/PLS OXIMETRY 1: CPT

## 2020-01-01 PROCEDURE — 85007 BL SMEAR W/DIFF WBC COUNT: CPT

## 2020-01-01 PROCEDURE — 96367 TX/PROPH/DG ADDL SEQ IV INF: CPT

## 2020-01-01 PROCEDURE — 83605 ASSAY OF LACTIC ACID: CPT | Mod: 91

## 2020-01-01 PROCEDURE — 86850 RBC ANTIBODY SCREEN: CPT

## 2020-01-01 PROCEDURE — 96376 TX/PRO/DX INJ SAME DRUG ADON: CPT

## 2020-01-01 PROCEDURE — 37799 UNLISTED PX VASCULAR SURGERY: CPT

## 2020-01-01 PROCEDURE — 85347 COAGULATION TIME ACTIVATED: CPT

## 2020-01-01 PROCEDURE — 36556 INSERT NON-TUNNEL CV CATH: CPT | Performed by: INTERNAL MEDICINE

## 2020-01-01 PROCEDURE — 85610 PROTHROMBIN TIME: CPT | Mod: 91

## 2020-01-01 PROCEDURE — 94770 HCHG CO2 EXPIRED GAS DETERMINATION: CPT

## 2020-01-01 PROCEDURE — 302977 HCHG BRONCHOSCOPY PROC-THERAPEUTIC

## 2020-01-01 PROCEDURE — 80307 DRUG TEST PRSMV CHEM ANLYZR: CPT

## 2020-01-01 PROCEDURE — 700101 HCHG RX REV CODE 250: Performed by: EMERGENCY MEDICINE

## 2020-01-01 PROCEDURE — 82607 VITAMIN B-12: CPT

## 2020-01-01 PROCEDURE — 83615 LACTATE (LD) (LDH) ENZYME: CPT

## 2020-01-01 PROCEDURE — 83935 ASSAY OF URINE OSMOLALITY: CPT

## 2020-01-01 PROCEDURE — C9113 INJ PANTOPRAZOLE SODIUM, VIA: HCPCS | Performed by: EMERGENCY MEDICINE

## 2020-01-01 PROCEDURE — 160048 HCHG OR STATISTICAL LEVEL 1-5: Performed by: INTERNAL MEDICINE

## 2020-01-01 PROCEDURE — 76700 US EXAM ABDOM COMPLETE: CPT

## 2020-01-01 PROCEDURE — 80053 COMPREHEN METABOLIC PANEL: CPT

## 2020-01-01 PROCEDURE — 85027 COMPLETE CBC AUTOMATED: CPT | Mod: 91

## 2020-01-01 PROCEDURE — 92950 HEART/LUNG RESUSCITATION CPR: CPT

## 2020-01-01 PROCEDURE — 700111 HCHG RX REV CODE 636 W/ 250 OVERRIDE (IP): Performed by: EMERGENCY MEDICINE

## 2020-01-01 PROCEDURE — 302128 INFUSION PUMP: Performed by: INTERNAL MEDICINE

## 2020-01-01 PROCEDURE — 87426 SARSCOV CORONAVIRUS AG IA: CPT

## 2020-01-01 PROCEDURE — 30243K1 TRANSFUSION OF NONAUTOLOGOUS FROZEN PLASMA INTO CENTRAL VEIN, PERCUTANEOUS APPROACH: ICD-10-PCS | Performed by: INTERNAL MEDICINE

## 2020-01-01 PROCEDURE — 87389 HIV-1 AG W/HIV-1&-2 AB AG IA: CPT

## 2020-01-01 PROCEDURE — 43762 RPLC GTUBE NO REVJ TRC: CPT

## 2020-01-01 PROCEDURE — 82746 ASSAY OF FOLIC ACID SERUM: CPT

## 2020-01-01 PROCEDURE — 0B968ZZ DRAINAGE OF RIGHT LOWER LOBE BRONCHUS, VIA NATURAL OR ARTIFICIAL OPENING ENDOSCOPIC: ICD-10-PCS | Performed by: INTERNAL MEDICINE

## 2020-01-01 PROCEDURE — 94002 VENT MGMT INPAT INIT DAY: CPT

## 2020-01-01 PROCEDURE — 700101 HCHG RX REV CODE 250: Performed by: INTERNAL MEDICINE

## 2020-01-01 PROCEDURE — 94003 VENT MGMT INPAT SUBQ DAY: CPT

## 2020-01-01 PROCEDURE — 85576 BLOOD PLATELET AGGREGATION: CPT

## 2020-01-01 PROCEDURE — 86923 COMPATIBILITY TEST ELECTRIC: CPT | Mod: 91

## 2020-01-01 PROCEDURE — 85025 COMPLETE CBC W/AUTO DIFF WBC: CPT

## 2020-01-01 PROCEDURE — 96366 THER/PROPH/DIAG IV INF ADDON: CPT

## 2020-01-01 PROCEDURE — 85027 COMPLETE CBC AUTOMATED: CPT

## 2020-01-01 PROCEDURE — 85046 RETICYTE/HGB CONCENTRATE: CPT

## 2020-01-01 PROCEDURE — 96368 THER/DIAG CONCURRENT INF: CPT

## 2020-01-01 PROCEDURE — 770022 HCHG ROOM/CARE - ICU (200)

## 2020-01-01 PROCEDURE — 83930 ASSAY OF BLOOD OSMOLALITY: CPT

## 2020-01-01 PROCEDURE — C1751 CATH, INF, PER/CENT/MIDLINE: HCPCS | Performed by: INTERNAL MEDICINE

## 2020-01-01 PROCEDURE — 99285 EMERGENCY DEPT VISIT HI MDM: CPT

## 2020-01-01 PROCEDURE — 500066 HCHG BITE BLOCK, ECT: Performed by: INTERNAL MEDICINE

## 2020-01-01 PROCEDURE — 86900 BLOOD TYPING SEROLOGIC ABO: CPT

## 2020-01-01 PROCEDURE — 160208 HCHG ENDO MINUTES - EA ADDL 1 MIN LEVEL 4: Performed by: INTERNAL MEDICINE

## 2020-01-01 PROCEDURE — 82570 ASSAY OF URINE CREATININE: CPT

## 2020-01-01 PROCEDURE — 93005 ELECTROCARDIOGRAM TRACING: CPT | Performed by: EMERGENCY MEDICINE

## 2020-01-01 PROCEDURE — 99291 CRITICAL CARE FIRST HOUR: CPT

## 2020-01-01 PROCEDURE — 80048 BASIC METABOLIC PNL TOTAL CA: CPT

## 2020-01-01 PROCEDURE — 84300 ASSAY OF URINE SODIUM: CPT

## 2020-01-01 PROCEDURE — P9016 RBC LEUKOCYTES REDUCED: HCPCS | Mod: 91

## 2020-01-01 PROCEDURE — 83010 ASSAY OF HAPTOGLOBIN QUANT: CPT

## 2020-01-01 PROCEDURE — 96375 TX/PRO/DX INJ NEW DRUG ADDON: CPT

## 2020-01-01 PROCEDURE — 97161 PT EVAL LOW COMPLEX 20 MIN: CPT

## 2020-01-01 PROCEDURE — 99238 HOSP IP/OBS DSCHRG MGMT 30/<: CPT | Mod: GC | Performed by: HOSPITALIST

## 2020-01-01 PROCEDURE — 82803 BLOOD GASES ANY COMBINATION: CPT

## 2020-01-01 PROCEDURE — 80048 BASIC METABOLIC PNL TOTAL CA: CPT | Mod: 91

## 2020-01-01 PROCEDURE — 304538 HCHG NG TUBE

## 2020-01-01 PROCEDURE — 84156 ASSAY OF PROTEIN URINE: CPT

## 2020-01-01 PROCEDURE — 82248 BILIRUBIN DIRECT: CPT

## 2020-01-01 PROCEDURE — 5A1945Z RESPIRATORY VENTILATION, 24-96 CONSECUTIVE HOURS: ICD-10-PCS | Performed by: EMERGENCY MEDICINE

## 2020-01-01 PROCEDURE — 0B9B8ZZ DRAINAGE OF LEFT LOWER LOBE BRONCHUS, VIA NATURAL OR ARTIFICIAL OPENING ENDOSCOPIC: ICD-10-PCS | Performed by: INTERNAL MEDICINE

## 2020-01-01 PROCEDURE — 160203 HCHG ENDO MINUTES - 1ST 30 MINS LEVEL 4: Performed by: INTERNAL MEDICINE

## 2020-01-01 PROCEDURE — U0003 INFECTIOUS AGENT DETECTION BY NUCLEIC ACID (DNA OR RNA); SEVERE ACUTE RESPIRATORY SYNDROME CORONAVIRUS 2 (SARS-COV-2) (CORONAVIRUS DISEASE [COVID-19]), AMPLIFIED PROBE TECHNIQUE, MAKING USE OF HIGH THROUGHPUT TECHNOLOGIES AS DESCRIBED BY CMS-2020-01-R: HCPCS

## 2020-01-01 PROCEDURE — 70450 CT HEAD/BRAIN W/O DYE: CPT

## 2020-01-01 PROCEDURE — C9113 INJ PANTOPRAZOLE SODIUM, VIA: HCPCS | Performed by: STUDENT IN AN ORGANIZED HEALTH CARE EDUCATION/TRAINING PROGRAM

## 2020-01-01 PROCEDURE — 700117 HCHG RX CONTRAST REV CODE 255: Performed by: INTERNAL MEDICINE

## 2020-01-01 PROCEDURE — 36430 TRANSFUSION BLD/BLD COMPNT: CPT

## 2020-01-01 PROCEDURE — 99291 CRITICAL CARE FIRST HOUR: CPT | Performed by: INTERNAL MEDICINE

## 2020-01-01 PROCEDURE — C1751 CATH, INF, PER/CENT/MIDLINE: HCPCS

## 2020-01-01 PROCEDURE — 99214 OFFICE O/P EST MOD 30 MIN: CPT | Performed by: NURSE PRACTITIONER

## 2020-01-01 PROCEDURE — 85007 BL SMEAR W/DIFF WBC COUNT: CPT | Mod: 91

## 2020-01-01 PROCEDURE — 06L38CZ OCCLUSION OF ESOPHAGEAL VEIN WITH EXTRALUMINAL DEVICE, VIA NATURAL OR ARTIFICIAL OPENING ENDOSCOPIC: ICD-10-PCS | Performed by: INTERNAL MEDICINE

## 2020-01-01 PROCEDURE — 82728 ASSAY OF FERRITIN: CPT

## 2020-01-01 PROCEDURE — 74177 CT ABD & PELVIS W/CONTRAST: CPT

## 2020-01-01 PROCEDURE — P9034 PLATELETS, PHERESIS: HCPCS

## 2020-01-01 PROCEDURE — 82962 GLUCOSE BLOOD TEST: CPT | Mod: 91

## 2020-01-01 PROCEDURE — 99223 1ST HOSP IP/OBS HIGH 75: CPT | Mod: GC | Performed by: HOSPITALIST

## 2020-01-01 PROCEDURE — 99152 MOD SED SAME PHYS/QHP 5/>YRS: CPT

## 2020-01-01 PROCEDURE — 84133 ASSAY OF URINE POTASSIUM: CPT

## 2020-01-01 PROCEDURE — P9017 PLASMA 1 DONOR FRZ W/IN 8 HR: HCPCS | Mod: 91

## 2020-01-01 PROCEDURE — 700101 HCHG RX REV CODE 250: Performed by: STUDENT IN AN ORGANIZED HEALTH CARE EDUCATION/TRAINING PROGRAM

## 2020-01-01 PROCEDURE — C9803 HOPD COVID-19 SPEC COLLECT: HCPCS | Performed by: STUDENT IN AN ORGANIZED HEALTH CARE EDUCATION/TRAINING PROGRAM

## 2020-01-01 PROCEDURE — 80061 LIPID PANEL: CPT

## 2020-01-01 PROCEDURE — 83550 IRON BINDING TEST: CPT

## 2020-01-01 PROCEDURE — 30243R1 TRANSFUSION OF NONAUTOLOGOUS PLATELETS INTO CENTRAL VEIN, PERCUTANEOUS APPROACH: ICD-10-PCS | Performed by: INTERNAL MEDICINE

## 2020-01-01 RX ORDER — FOLIC ACID 1 MG/1
1 TABLET ORAL DAILY
Qty: 30 TAB | Refills: 0 | Status: SHIPPED | OUTPATIENT
Start: 2020-01-01

## 2020-01-01 RX ORDER — SODIUM CHLORIDE 9 MG/ML
INJECTION, SOLUTION INTRAVENOUS CONTINUOUS
Status: DISCONTINUED | OUTPATIENT
Start: 2020-01-01 | End: 2020-01-01 | Stop reason: HOSPADM

## 2020-01-01 RX ORDER — LORAZEPAM 2 MG/ML
1.5 INJECTION INTRAMUSCULAR
Status: DISCONTINUED | OUTPATIENT
Start: 2020-01-01 | End: 2020-01-01 | Stop reason: HOSPADM

## 2020-01-01 RX ORDER — PHENYLEPHRINE HCL IN 0.9% NACL 0.5 MG/5ML
SYRINGE (ML) INTRAVENOUS
Status: COMPLETED
Start: 2020-01-01 | End: 2020-01-01

## 2020-01-01 RX ORDER — SODIUM CHLORIDE 9 MG/ML
INJECTION, SOLUTION INTRAVENOUS
Status: ACTIVE
Start: 2020-01-01 | End: 2020-01-01

## 2020-01-01 RX ORDER — SODIUM CHLORIDE 9 MG/ML
INJECTION, SOLUTION INTRAVENOUS CONTINUOUS
Status: DISCONTINUED | OUTPATIENT
Start: 2020-01-01 | End: 2020-01-01

## 2020-01-01 RX ORDER — DEXTROSE MONOHYDRATE 100 MG/ML
INJECTION, SOLUTION INTRAVENOUS CONTINUOUS
Status: DISCONTINUED | OUTPATIENT
Start: 2020-01-01 | End: 2020-01-01

## 2020-01-01 RX ORDER — PANTOPRAZOLE SODIUM 40 MG/10ML
40 INJECTION, POWDER, LYOPHILIZED, FOR SOLUTION INTRAVENOUS 2 TIMES DAILY
Status: DISCONTINUED | OUTPATIENT
Start: 2020-01-01 | End: 2020-01-01

## 2020-01-01 RX ORDER — FAMOTIDINE 20 MG/1
20 TABLET, FILM COATED ORAL DAILY
Status: DISCONTINUED | OUTPATIENT
Start: 2020-01-01 | End: 2020-01-01

## 2020-01-01 RX ORDER — LORAZEPAM 2 MG/ML
1 INJECTION INTRAMUSCULAR
Status: DISCONTINUED | OUTPATIENT
Start: 2020-01-01 | End: 2020-01-01 | Stop reason: HOSPADM

## 2020-01-01 RX ORDER — NOREPINEPHRINE BITARTRATE 0.03 MG/ML
0-30 INJECTION, SOLUTION INTRAVENOUS CONTINUOUS
Status: DISCONTINUED | OUTPATIENT
Start: 2020-01-01 | End: 2020-01-01

## 2020-01-01 RX ORDER — CALCIUM CHLORIDE 100 MG/ML
INJECTION INTRAVENOUS; INTRAVENTRICULAR
Status: COMPLETED
Start: 2020-01-01 | End: 2020-01-01

## 2020-01-01 RX ORDER — DEXTROSE MONOHYDRATE 25 G/50ML
50 INJECTION, SOLUTION INTRAVENOUS
Status: DISCONTINUED | OUTPATIENT
Start: 2020-01-01 | End: 2020-01-01

## 2020-01-01 RX ORDER — SODIUM CHLORIDE 9 MG/ML
1000 INJECTION, SOLUTION INTRAVENOUS ONCE
Status: COMPLETED | OUTPATIENT
Start: 2020-01-01 | End: 2020-01-01

## 2020-01-01 RX ORDER — POLYETHYLENE GLYCOL 3350 17 G/17G
1 POWDER, FOR SOLUTION ORAL
Status: DISCONTINUED | OUTPATIENT
Start: 2020-01-01 | End: 2020-01-01

## 2020-01-01 RX ORDER — PROMETHAZINE HYDROCHLORIDE 25 MG/1
25 TABLET ORAL EVERY 6 HOURS PRN
Status: DISCONTINUED | OUTPATIENT
Start: 2020-01-01 | End: 2020-01-01 | Stop reason: HOSPADM

## 2020-01-01 RX ORDER — ALBUMIN (HUMAN) 12.5 G/50ML
25 SOLUTION INTRAVENOUS EVERY 6 HOURS
Status: DISCONTINUED | OUTPATIENT
Start: 2020-01-01 | End: 2020-01-01

## 2020-01-01 RX ORDER — CALCIUM CHLORIDE 100 MG/ML
1 INJECTION INTRAVENOUS; INTRAVENTRICULAR ONCE
Status: COMPLETED | OUTPATIENT
Start: 2020-01-01 | End: 2020-01-01

## 2020-01-01 RX ORDER — ATROPINE SULFATE 10 MG/ML
2 SOLUTION/ DROPS OPHTHALMIC EVERY 4 HOURS PRN
Status: DISCONTINUED | OUTPATIENT
Start: 2020-01-01 | End: 2020-01-01 | Stop reason: HOSPADM

## 2020-01-01 RX ORDER — FUROSEMIDE 20 MG/1
20 TABLET ORAL
COMMUNITY
Start: 2020-01-01

## 2020-01-01 RX ORDER — IPRATROPIUM BROMIDE AND ALBUTEROL SULFATE 2.5; .5 MG/3ML; MG/3ML
3 SOLUTION RESPIRATORY (INHALATION)
Status: DISCONTINUED | OUTPATIENT
Start: 2020-01-01 | End: 2020-01-01

## 2020-01-01 RX ORDER — PHENYLEPHRINE HCL IN 0.9% NACL 0.5 MG/5ML
100 SYRINGE (ML) INTRAVENOUS
Status: DISPENSED | OUTPATIENT
Start: 2020-01-01 | End: 2020-01-01

## 2020-01-01 RX ORDER — POTASSIUM CHLORIDE 7.45 MG/ML
10 INJECTION INTRAVENOUS ONCE
Status: COMPLETED | OUTPATIENT
Start: 2020-01-01 | End: 2020-01-01

## 2020-01-01 RX ORDER — POTASSIUM CHLORIDE 20 MEQ/1
40 TABLET, EXTENDED RELEASE ORAL 2 TIMES DAILY
Status: COMPLETED | OUTPATIENT
Start: 2020-01-01 | End: 2020-01-01

## 2020-01-01 RX ORDER — OMEPRAZOLE 20 MG/1
CAPSULE, DELAYED RELEASE ORAL
Status: DISPENSED
Start: 2020-01-01 | End: 2020-01-01

## 2020-01-01 RX ORDER — BISACODYL 10 MG
10 SUPPOSITORY, RECTAL RECTAL
Status: DISCONTINUED | OUTPATIENT
Start: 2020-01-01 | End: 2020-01-01 | Stop reason: HOSPADM

## 2020-01-01 RX ORDER — POLYETHYLENE GLYCOL 3350 17 G/17G
1 POWDER, FOR SOLUTION ORAL
Status: DISCONTINUED | OUTPATIENT
Start: 2020-01-01 | End: 2020-01-01 | Stop reason: HOSPADM

## 2020-01-01 RX ORDER — SODIUM BICARBONATE IN D5W 150/1000ML
PLASTIC BAG, INJECTION (ML) INTRAVENOUS CONTINUOUS
Status: DISCONTINUED | OUTPATIENT
Start: 2020-01-01 | End: 2020-01-01

## 2020-01-01 RX ORDER — DEXTROSE MONOHYDRATE 25 G/50ML
INJECTION, SOLUTION INTRAVENOUS
Status: DISPENSED
Start: 2020-01-01 | End: 2020-01-01

## 2020-01-01 RX ORDER — SODIUM CHLORIDE, SODIUM LACTATE, POTASSIUM CHLORIDE, AND CALCIUM CHLORIDE .6; .31; .03; .02 G/100ML; G/100ML; G/100ML; G/100ML
500 INJECTION, SOLUTION INTRAVENOUS ONCE
Status: COMPLETED | OUTPATIENT
Start: 2020-01-01 | End: 2020-01-01

## 2020-01-01 RX ORDER — AMOXICILLIN 250 MG
2 CAPSULE ORAL 2 TIMES DAILY
Status: DISCONTINUED | OUTPATIENT
Start: 2020-01-01 | End: 2020-01-01 | Stop reason: HOSPADM

## 2020-01-01 RX ORDER — FOLIC ACID 1 MG/1
1 TABLET ORAL DAILY
Status: DISCONTINUED | OUTPATIENT
Start: 2020-01-01 | End: 2020-01-01

## 2020-01-01 RX ORDER — THIAMINE MONONITRATE (VIT B1) 100 MG
100 TABLET ORAL DAILY
Status: DISCONTINUED | OUTPATIENT
Start: 2020-01-01 | End: 2020-01-01 | Stop reason: HOSPADM

## 2020-01-01 RX ORDER — LORAZEPAM 2 MG/1
2 TABLET ORAL
Status: DISCONTINUED | OUTPATIENT
Start: 2020-01-01 | End: 2020-01-01 | Stop reason: HOSPADM

## 2020-01-01 RX ORDER — LABETALOL 200 MG/1
200 TABLET, FILM COATED ORAL EVERY 6 HOURS PRN
Status: DISCONTINUED | OUTPATIENT
Start: 2020-01-01 | End: 2020-01-01 | Stop reason: HOSPADM

## 2020-01-01 RX ORDER — TRANEXAMIC ACID 100 MG/ML
INJECTION, SOLUTION INTRAVENOUS
Status: COMPLETED
Start: 2020-01-01 | End: 2020-01-01

## 2020-01-01 RX ORDER — SODIUM CHLORIDE 9 MG/ML
INJECTION, SOLUTION INTRAVENOUS
Status: DISPENSED
Start: 2020-01-01 | End: 2020-01-01

## 2020-01-01 RX ORDER — LORAZEPAM 2 MG/ML
1.5 INJECTION INTRAMUSCULAR
Status: DISCONTINUED | OUTPATIENT
Start: 2020-01-01 | End: 2020-01-01

## 2020-01-01 RX ORDER — LORAZEPAM 0.5 MG/1
0.5 TABLET ORAL EVERY 4 HOURS PRN
Status: DISCONTINUED | OUTPATIENT
Start: 2020-01-01 | End: 2020-01-01

## 2020-01-01 RX ORDER — LANOLIN ALCOHOL/MO/W.PET/CERES
100 CREAM (GRAM) TOPICAL DAILY
Qty: 30 TAB | Refills: 0 | Status: SHIPPED | OUTPATIENT
Start: 2020-01-01

## 2020-01-01 RX ORDER — LABETALOL HYDROCHLORIDE 5 MG/ML
10 INJECTION, SOLUTION INTRAVENOUS
Status: DISCONTINUED | OUTPATIENT
Start: 2020-01-01 | End: 2020-01-01 | Stop reason: HOSPADM

## 2020-01-01 RX ORDER — PHENYLEPHRINE HCL IN 0.9% NACL 0.5 MG/5ML
SYRINGE (ML) INTRAVENOUS
Status: ACTIVE
Start: 2020-01-01 | End: 2020-01-01

## 2020-01-01 RX ORDER — LORAZEPAM 0.5 MG/1
0.5 TABLET ORAL EVERY 4 HOURS PRN
Status: DISCONTINUED | OUTPATIENT
Start: 2020-01-01 | End: 2020-01-01 | Stop reason: HOSPADM

## 2020-01-01 RX ORDER — LORAZEPAM 1 MG/1
1 TABLET ORAL EVERY 4 HOURS PRN
Status: DISCONTINUED | OUTPATIENT
Start: 2020-01-01 | End: 2020-01-01

## 2020-01-01 RX ORDER — VECURONIUM BROMIDE 1 MG/ML
10 INJECTION, POWDER, LYOPHILIZED, FOR SOLUTION INTRAVENOUS ONCE
Status: COMPLETED | OUTPATIENT
Start: 2020-01-01 | End: 2020-01-01

## 2020-01-01 RX ORDER — LORAZEPAM 2 MG/ML
0.5 INJECTION INTRAMUSCULAR EVERY 4 HOURS PRN
Status: DISCONTINUED | OUTPATIENT
Start: 2020-01-01 | End: 2020-01-01 | Stop reason: HOSPADM

## 2020-01-01 RX ORDER — PROPOFOL 10 MG/ML
30 INJECTION, EMULSION INTRAVENOUS ONCE
Status: COMPLETED | OUTPATIENT
Start: 2020-01-01 | End: 2020-01-01

## 2020-01-01 RX ORDER — DEXTROSE MONOHYDRATE 25 G/50ML
50 INJECTION, SOLUTION INTRAVENOUS ONCE
Status: COMPLETED | OUTPATIENT
Start: 2020-01-01 | End: 2020-01-01

## 2020-01-01 RX ORDER — POTASSIUM CHLORIDE 20 MEQ/1
40 TABLET, EXTENDED RELEASE ORAL ONCE
Status: COMPLETED | OUTPATIENT
Start: 2020-01-01 | End: 2020-01-01

## 2020-01-01 RX ORDER — MAGNESIUM SULFATE HEPTAHYDRATE 40 MG/ML
2 INJECTION, SOLUTION INTRAVENOUS ONCE
Status: COMPLETED | OUTPATIENT
Start: 2020-01-01 | End: 2020-01-01

## 2020-01-01 RX ORDER — BISACODYL 10 MG
10 SUPPOSITORY, RECTAL RECTAL
Status: DISCONTINUED | OUTPATIENT
Start: 2020-01-01 | End: 2020-01-01

## 2020-01-01 RX ORDER — AMOXICILLIN 250 MG
2 CAPSULE ORAL 2 TIMES DAILY
Status: DISCONTINUED | OUTPATIENT
Start: 2020-01-01 | End: 2020-01-01

## 2020-01-01 RX ORDER — LORAZEPAM 2 MG/1
4 TABLET ORAL
Status: DISCONTINUED | OUTPATIENT
Start: 2020-01-01 | End: 2020-01-01

## 2020-01-01 RX ORDER — LORAZEPAM 2 MG/ML
1 INJECTION INTRAMUSCULAR
Status: DISCONTINUED | OUTPATIENT
Start: 2020-01-01 | End: 2020-01-01

## 2020-01-01 RX ORDER — FOLIC ACID 1 MG/1
1 TABLET ORAL DAILY
Status: DISCONTINUED | OUTPATIENT
Start: 2020-01-01 | End: 2020-01-01 | Stop reason: HOSPADM

## 2020-01-01 RX ORDER — LORAZEPAM 1 MG/1
1 TABLET ORAL EVERY 4 HOURS PRN
Status: DISCONTINUED | OUTPATIENT
Start: 2020-01-01 | End: 2020-01-01 | Stop reason: HOSPADM

## 2020-01-01 RX ORDER — THIAMINE MONONITRATE (VIT B1) 100 MG
100 TABLET ORAL DAILY
Status: DISCONTINUED | OUTPATIENT
Start: 2020-01-01 | End: 2020-01-01

## 2020-01-01 RX ORDER — DEXTROSE MONOHYDRATE 25 G/50ML
INJECTION, SOLUTION INTRAVENOUS
Status: ACTIVE
Start: 2020-01-01 | End: 2020-01-01

## 2020-01-01 RX ORDER — CEFTRIAXONE 1 G/1
INJECTION, POWDER, FOR SOLUTION INTRAMUSCULAR; INTRAVENOUS
Status: DISPENSED
Start: 2020-01-01 | End: 2020-01-01

## 2020-01-01 RX ORDER — LORAZEPAM 2 MG/ML
2 INJECTION INTRAMUSCULAR
Status: DISCONTINUED | OUTPATIENT
Start: 2020-01-01 | End: 2020-01-01 | Stop reason: HOSPADM

## 2020-01-01 RX ORDER — DEXMEDETOMIDINE HYDROCHLORIDE 4 UG/ML
0-1.5 INJECTION, SOLUTION INTRAVENOUS CONTINUOUS
Status: DISCONTINUED | OUTPATIENT
Start: 2020-01-01 | End: 2020-01-01

## 2020-01-01 RX ORDER — LORAZEPAM 2 MG/ML
1 CONCENTRATE ORAL
Status: DISCONTINUED | OUTPATIENT
Start: 2020-01-01 | End: 2020-01-01 | Stop reason: HOSPADM

## 2020-01-01 RX ORDER — LORAZEPAM 2 MG/1
4 TABLET ORAL
Status: DISCONTINUED | OUTPATIENT
Start: 2020-01-01 | End: 2020-01-01 | Stop reason: HOSPADM

## 2020-01-01 RX ORDER — SCOLOPAMINE TRANSDERMAL SYSTEM 1 MG/1
1 PATCH, EXTENDED RELEASE TRANSDERMAL
Status: DISCONTINUED | OUTPATIENT
Start: 2020-01-01 | End: 2020-01-01 | Stop reason: HOSPADM

## 2020-01-01 RX ORDER — DEXMEDETOMIDINE HYDROCHLORIDE 4 UG/ML
.1-1.5 INJECTION, SOLUTION INTRAVENOUS CONTINUOUS
Status: DISCONTINUED | OUTPATIENT
Start: 2020-01-01 | End: 2020-01-01

## 2020-01-01 RX ORDER — LORAZEPAM 2 MG/ML
2 INJECTION INTRAMUSCULAR
Status: DISCONTINUED | OUTPATIENT
Start: 2020-01-01 | End: 2020-01-01

## 2020-01-01 RX ORDER — LORAZEPAM 2 MG/ML
0.5 INJECTION INTRAMUSCULAR EVERY 4 HOURS PRN
Status: DISCONTINUED | OUTPATIENT
Start: 2020-01-01 | End: 2020-01-01

## 2020-01-01 RX ORDER — MORPHINE SULFATE 10 MG/ML
10 INJECTION, SOLUTION INTRAMUSCULAR; INTRAVENOUS ONCE
Status: DISCONTINUED | OUTPATIENT
Start: 2020-01-01 | End: 2020-01-01 | Stop reason: HOSPADM

## 2020-01-01 RX ORDER — MORPHINE SULFATE 10 MG/ML
10 INJECTION, SOLUTION INTRAMUSCULAR; INTRAVENOUS
Status: DISCONTINUED | OUTPATIENT
Start: 2020-01-01 | End: 2020-01-01 | Stop reason: HOSPADM

## 2020-01-01 RX ORDER — LORAZEPAM 2 MG/1
2 TABLET ORAL
Status: DISCONTINUED | OUTPATIENT
Start: 2020-01-01 | End: 2020-01-01

## 2020-01-01 RX ADMIN — DEXMEDETOMIDINE HYDROCHLORIDE 1.5 MCG/KG/HR: 100 INJECTION, SOLUTION INTRAVENOUS at 02:01

## 2020-01-01 RX ADMIN — SODIUM CHLORIDE, POTASSIUM CHLORIDE, SODIUM LACTATE AND CALCIUM CHLORIDE 500 ML: 600; 310; 30; 20 INJECTION, SOLUTION INTRAVENOUS at 02:36

## 2020-01-01 RX ADMIN — IOHEXOL 100 ML: 350 INJECTION, SOLUTION INTRAVENOUS at 08:15

## 2020-01-01 RX ADMIN — SODIUM CHLORIDE 3 G: 900 INJECTION INTRAVENOUS at 21:11

## 2020-01-01 RX ADMIN — Medication 100 MEQ: at 17:13

## 2020-01-01 RX ADMIN — DEXMEDETOMIDINE HYDROCHLORIDE 1.5 MCG/KG/HR: 100 INJECTION, SOLUTION INTRAVENOUS at 06:00

## 2020-01-01 RX ADMIN — POTASSIUM CHLORIDE 40 MEQ: 1500 TABLET, EXTENDED RELEASE ORAL at 05:59

## 2020-01-01 RX ADMIN — Medication 500 MCG: at 12:37

## 2020-01-01 RX ADMIN — Medication 100 MCG/HR: at 14:41

## 2020-01-01 RX ADMIN — SODIUM BICARBONATE: 84 INJECTION, SOLUTION INTRAVENOUS at 11:18

## 2020-01-01 RX ADMIN — SODIUM BICARBONATE 100 MEQ: 84 INJECTION, SOLUTION INTRAVENOUS at 17:13

## 2020-01-01 RX ADMIN — NOREPINEPHRINE BITARTRATE 30 MCG/MIN: 1 INJECTION INTRAVENOUS at 18:26

## 2020-01-01 RX ADMIN — POTASSIUM CHLORIDE 40 MEQ: 1500 TABLET, EXTENDED RELEASE ORAL at 04:07

## 2020-01-01 RX ADMIN — OCTREOTIDE ACETATE 50 MCG/HR: 200 INJECTION, SOLUTION INTRAVENOUS; SUBCUTANEOUS at 06:12

## 2020-01-01 RX ADMIN — DEXMEDETOMIDINE HYDROCHLORIDE 1.5 MCG/KG/HR: 100 INJECTION, SOLUTION INTRAVENOUS at 18:26

## 2020-01-01 RX ADMIN — THERA TABS 1 TABLET: TAB at 06:11

## 2020-01-01 RX ADMIN — PHENYLEPHRINE HYDROCHLORIDE 120 MCG/MIN: 10 INJECTION INTRAVENOUS at 02:35

## 2020-01-01 RX ADMIN — FENTANYL CITRATE 100 MCG: 50 INJECTION, SOLUTION INTRAMUSCULAR; INTRAVENOUS at 16:29

## 2020-01-01 RX ADMIN — SODIUM CHLORIDE 3 G: 900 INJECTION INTRAVENOUS at 23:28

## 2020-01-01 RX ADMIN — SODIUM BICARBONATE 150 ML/HR: 84 INJECTION, SOLUTION INTRAVENOUS at 18:15

## 2020-01-01 RX ADMIN — DEXTROSE MONOHYDRATE 50 ML: 25 INJECTION, SOLUTION INTRAVENOUS at 05:45

## 2020-01-01 RX ADMIN — SODIUM BICARBONATE 150 ML/HR: 84 INJECTION, SOLUTION INTRAVENOUS at 01:55

## 2020-01-01 RX ADMIN — SODIUM BICARBONATE 100 MEQ: 84 INJECTION, SOLUTION INTRAVENOUS at 13:30

## 2020-01-01 RX ADMIN — SODIUM CHLORIDE 8 MG/HR: 9 INJECTION, SOLUTION INTRAVENOUS at 13:38

## 2020-01-01 RX ADMIN — SODIUM CHLORIDE 80 MG: 9 INJECTION, SOLUTION INTRAVENOUS at 06:15

## 2020-01-01 RX ADMIN — VECURONIUM BROMIDE 10 MG: 10 INJECTION, POWDER, LYOPHILIZED, FOR SOLUTION INTRAVENOUS at 16:25

## 2020-01-01 RX ADMIN — CEFTRIAXONE SODIUM 2 G: 2 INJECTION, POWDER, FOR SOLUTION INTRAMUSCULAR; INTRAVENOUS at 06:11

## 2020-01-01 RX ADMIN — VASOPRESSIN 0.03 UNITS/MIN: 20 INJECTION INTRAVENOUS at 08:34

## 2020-01-01 RX ADMIN — DEXMEDETOMIDINE HYDROCHLORIDE 0.3 MCG/KG/HR: 100 INJECTION, SOLUTION INTRAVENOUS at 08:31

## 2020-01-01 RX ADMIN — SODIUM BICARBONATE 150 ML/HR: 84 INJECTION, SOLUTION INTRAVENOUS at 08:34

## 2020-01-01 RX ADMIN — CALCIUM CHLORIDE 1 G: 100 INJECTION, SOLUTION INTRAVENOUS at 14:30

## 2020-01-01 RX ADMIN — PHYTONADIONE 10 MG: 10 INJECTION, EMULSION INTRAMUSCULAR; INTRAVENOUS; SUBCUTANEOUS at 06:29

## 2020-01-01 RX ADMIN — SODIUM CHLORIDE 1000 ML: 9 INJECTION, SOLUTION INTRAVENOUS at 23:18

## 2020-01-01 RX ADMIN — ALBUMIN (HUMAN) 25 G: 5 SOLUTION INTRAVENOUS at 06:07

## 2020-01-01 RX ADMIN — THIAMINE HYDROCHLORIDE: 100 INJECTION, SOLUTION INTRAMUSCULAR; INTRAVENOUS at 11:48

## 2020-01-01 RX ADMIN — CALCIUM CHLORIDE 1 G: 100 INJECTION INTRAVENOUS; INTRAVENTRICULAR at 09:18

## 2020-01-01 RX ADMIN — OCTREOTIDE ACETATE 50 MCG/HR: 200 INJECTION, SOLUTION INTRAVENOUS; SUBCUTANEOUS at 06:15

## 2020-01-01 RX ADMIN — DEXMEDETOMIDINE HYDROCHLORIDE 1.5 MCG/KG/HR: 100 INJECTION, SOLUTION INTRAVENOUS at 05:45

## 2020-01-01 RX ADMIN — Medication 100 MG: at 04:07

## 2020-01-01 RX ADMIN — MORPHINE SULFATE 10 MG: 10 INJECTION INTRAVENOUS at 11:39

## 2020-01-01 RX ADMIN — THERA TABS 1 TABLET: TAB at 04:08

## 2020-01-01 RX ADMIN — NOREPINEPHRINE BITARTRATE 30 MCG/MIN: 1 INJECTION INTRAVENOUS at 06:00

## 2020-01-01 RX ADMIN — VASOPRESSIN 0.03 UNITS/MIN: 20 INJECTION INTRAVENOUS at 12:39

## 2020-01-01 RX ADMIN — DEXTROSE MONOHYDRATE: 100 INJECTION, SOLUTION INTRAVENOUS at 05:46

## 2020-01-01 RX ADMIN — SODIUM CHLORIDE 8 MG/HR: 9 INJECTION, SOLUTION INTRAVENOUS at 08:34

## 2020-01-01 RX ADMIN — FOLIC ACID 1 MG: 1 TABLET ORAL at 04:07

## 2020-01-01 RX ADMIN — Medication 50 MEQ: at 09:19

## 2020-01-01 RX ADMIN — SODIUM BICARBONATE 150 MEQ: 84 INJECTION, SOLUTION INTRAVENOUS at 14:36

## 2020-01-01 RX ADMIN — POTASSIUM CHLORIDE 10 MEQ: 7.46 INJECTION, SOLUTION INTRAVENOUS at 11:30

## 2020-01-01 RX ADMIN — NOREPINEPHRINE BITARTRATE 30 MCG/MIN: 1 INJECTION INTRAVENOUS at 08:31

## 2020-01-01 RX ADMIN — FOLIC ACID 1 MG: 1 TABLET ORAL at 06:11

## 2020-01-01 RX ADMIN — CALCIUM CHLORIDE 1 G: 100 INJECTION INTRAVENOUS; INTRAVENTRICULAR at 14:30

## 2020-01-01 RX ADMIN — SODIUM CHLORIDE 80 MG: 9 INJECTION, SOLUTION INTRAVENOUS at 03:44

## 2020-01-01 RX ADMIN — NOREPINEPHRINE BITARTRATE 30 MCG/MIN: 1 INJECTION INTRAVENOUS at 23:28

## 2020-01-01 RX ADMIN — DEXTROSE MONOHYDRATE 50 ML: 25 INJECTION, SOLUTION INTRAVENOUS at 22:30

## 2020-01-01 RX ADMIN — DEXMEDETOMIDINE HYDROCHLORIDE 1 MCG/KG/HR: 100 INJECTION, SOLUTION INTRAVENOUS at 12:36

## 2020-01-01 RX ADMIN — SODIUM CHLORIDE: 9 INJECTION, SOLUTION INTRAVENOUS at 18:19

## 2020-01-01 RX ADMIN — POTASSIUM CHLORIDE 40 MEQ: 1500 TABLET, EXTENDED RELEASE ORAL at 06:11

## 2020-01-01 RX ADMIN — SODIUM BICARBONATE 150 MEQ: 84 INJECTION, SOLUTION INTRAVENOUS at 13:05

## 2020-01-01 RX ADMIN — CEFTRIAXONE SODIUM 2 G: 2 INJECTION, POWDER, FOR SOLUTION INTRAMUSCULAR; INTRAVENOUS at 10:53

## 2020-01-01 RX ADMIN — SODIUM BICARBONATE 150 ML/HR: 84 INJECTION, SOLUTION INTRAVENOUS at 09:42

## 2020-01-01 RX ADMIN — NOREPINEPHRINE BITARTRATE 30 MCG/MIN: 1 INJECTION INTRAVENOUS at 04:11

## 2020-01-01 RX ADMIN — DEXTROSE MONOHYDRATE 50 ML: 25 INJECTION, SOLUTION INTRAVENOUS at 05:02

## 2020-01-01 RX ADMIN — DEXMEDETOMIDINE HYDROCHLORIDE 1.5 MCG/KG/HR: 100 INJECTION, SOLUTION INTRAVENOUS at 15:42

## 2020-01-01 RX ADMIN — Medication 100 MG: at 06:11

## 2020-01-01 RX ADMIN — SODIUM CHLORIDE 1000 ML: 9 INJECTION, SOLUTION INTRAVENOUS at 09:27

## 2020-01-01 RX ADMIN — NOREPINEPHRINE BITARTRATE 40 MCG/MIN: 1 INJECTION INTRAVENOUS at 10:05

## 2020-01-01 RX ADMIN — DEXTROSE MONOHYDRATE 50 ML: 25 INJECTION, SOLUTION INTRAVENOUS at 10:43

## 2020-01-01 RX ADMIN — Medication 100 MEQ: at 13:30

## 2020-01-01 RX ADMIN — SODIUM BICARBONATE 50 MEQ: 84 INJECTION, SOLUTION INTRAVENOUS at 09:19

## 2020-01-01 RX ADMIN — PHENYLEPHRINE HYDROCHLORIDE 250 MCG/MIN: 10 INJECTION INTRAVENOUS at 08:36

## 2020-01-01 RX ADMIN — DEXTROSE MONOHYDRATE 50 ML: 25 INJECTION, SOLUTION INTRAVENOUS at 00:26

## 2020-01-01 RX ADMIN — PROPOFOL 30 MG: 10 INJECTION, EMULSION INTRAVENOUS at 16:25

## 2020-01-01 RX ADMIN — Medication 100 MCG/HR: at 05:11

## 2020-01-01 RX ADMIN — PHENYLEPHRINE HYDROCHLORIDE 300 MCG/MIN: 10 INJECTION INTRAVENOUS at 06:12

## 2020-01-01 RX ADMIN — Medication 150 MEQ: at 14:36

## 2020-01-01 RX ADMIN — SODIUM CHLORIDE 8 MG/HR: 9 INJECTION, SOLUTION INTRAVENOUS at 23:05

## 2020-01-01 RX ADMIN — CALCIUM CHLORIDE 1000 MG: 100 INJECTION, SOLUTION INTRAVENOUS at 07:11

## 2020-01-01 RX ADMIN — SODIUM CHLORIDE: 9 INJECTION, SOLUTION INTRAVENOUS at 09:28

## 2020-01-01 RX ADMIN — SODIUM CHLORIDE, POTASSIUM CHLORIDE, SODIUM LACTATE AND CALCIUM CHLORIDE 500 ML: 600; 310; 30; 20 INJECTION, SOLUTION INTRAVENOUS at 05:59

## 2020-01-01 RX ADMIN — SODIUM BICARBONATE 50 MEQ: 84 INJECTION, SOLUTION INTRAVENOUS at 11:17

## 2020-01-01 RX ADMIN — DEXMEDETOMIDINE HYDROCHLORIDE 1.5 MCG/KG/HR: 100 INJECTION, SOLUTION INTRAVENOUS at 21:12

## 2020-01-01 RX ADMIN — MAGNESIUM SULFATE 2 G: 2 INJECTION INTRAVENOUS at 17:20

## 2020-01-01 RX ADMIN — COAGULATION FACTOR VIIA (RECOMBINANT) 1000 MCG: KIT at 13:39

## 2020-01-01 RX ADMIN — POTASSIUM CHLORIDE 40 MEQ: 1500 TABLET, EXTENDED RELEASE ORAL at 17:46

## 2020-01-01 RX ADMIN — DEXMEDETOMIDINE HYDROCHLORIDE 1.5 MCG/KG/HR: 100 INJECTION, SOLUTION INTRAVENOUS at 05:13

## 2020-01-01 RX ADMIN — SODIUM CHLORIDE 3 G: 900 INJECTION INTRAVENOUS at 05:54

## 2020-01-01 RX ADMIN — DEXTROSE MONOHYDRATE 50 ML: 25 INJECTION, SOLUTION INTRAVENOUS at 06:40

## 2020-01-01 RX ADMIN — PHYTONADIONE 10 MG: 10 INJECTION, EMULSION INTRAMUSCULAR; INTRAVENOUS; SUBCUTANEOUS at 13:04

## 2020-01-01 RX ADMIN — NOREPINEPHRINE BITARTRATE 35 MCG/MIN: 1 INJECTION INTRAVENOUS at 13:03

## 2020-01-01 RX ADMIN — VASOPRESSIN 0.03 UNITS/MIN: 20 INJECTION INTRAVENOUS at 21:10

## 2020-01-01 RX ADMIN — SODIUM CHLORIDE 1000 ML: 9 INJECTION, SOLUTION INTRAVENOUS at 18:19

## 2020-01-01 RX ADMIN — CALCIUM CHLORIDE 1000 MG: 100 INJECTION, SOLUTION INTRAVENOUS at 13:04

## 2020-01-01 RX ADMIN — TRANEXAMIC ACID 1000 MG: 100 INJECTION, SOLUTION INTRAVENOUS at 10:00

## 2020-01-01 RX ADMIN — CEFTRIAXONE SODIUM 1 G: 1 INJECTION, POWDER, FOR SOLUTION INTRAMUSCULAR; INTRAVENOUS at 06:00

## 2020-01-01 RX ADMIN — DEXMEDETOMIDINE HYDROCHLORIDE 1.5 MCG/KG/HR: 100 INJECTION, SOLUTION INTRAVENOUS at 11:45

## 2020-01-01 ASSESSMENT — ENCOUNTER SYMPTOMS
SPUTUM PRODUCTION: 0
VOMITING: 1
HEMOPTYSIS: 0
BACK PAIN: 0
DIZZINESS: 0
HEADACHES: 0
FEVER: 0
DIARRHEA: 0
BLURRED VISION: 0
TINGLING: 0
VOMITING: 0
CHILLS: 0
SHORTNESS OF BREATH: 0
BLOOD IN STOOL: 0
FEVER: 0
FOCAL WEAKNESS: 1
TINGLING: 1
FLANK PAIN: 0
PALPITATIONS: 0
COUGH: 0
COUGH: 0
NAUSEA: 1
BLURRED VISION: 0
CHILLS: 0
NERVOUS/ANXIOUS: 0
DOUBLE VISION: 0
EYE REDNESS: 1
PALPITATIONS: 0
HEARTBURN: 0
HEADACHES: 0
SORE THROAT: 0
DIZZINESS: 0
DOUBLE VISION: 0
BRUISES/BLEEDS EASILY: 0
ABDOMINAL PAIN: 0
NAUSEA: 0
SEIZURES: 0
BRUISES/BLEEDS EASILY: 0
SHORTNESS OF BREATH: 0
FALLS: 1
TREMORS: 0
CONSTIPATION: 0

## 2020-01-01 ASSESSMENT — LIFESTYLE VARIABLES
TREMOR: TREMOR NOT VISIBLE BUT CAN BE FELT, FINGERTIP TO FINGERTIP
TREMOR: TREMOR NOT VISIBLE BUT CAN BE FELT, FINGERTIP TO FINGERTIP
EVER HAD A DRINK FIRST THING IN THE MORNING TO STEADY YOUR NERVES TO GET RID OF A HANGOVER: YES
AUDITORY DISTURBANCES: NOT PRESENT
EVER FELT BAD OR GUILTY ABOUT YOUR DRINKING: YES
TOTAL SCORE: 1
NAUSEA AND VOMITING: NO NAUSEA AND NO VOMITING
ORIENTATION AND CLOUDING OF SENSORIUM: ORIENTED AND CAN DO SERIAL ADDITIONS
AUDITORY DISTURBANCES: NOT PRESENT
TREMOR: *
HOW MANY TIMES IN THE PAST YEAR HAVE YOU HAD 5 OR MORE DRINKS IN A DAY: 365
TOTAL SCORE: 3
AGITATION: NORMAL ACTIVITY
VISUAL DISTURBANCES: NOT PRESENT
TREMOR: TREMOR NOT VISIBLE BUT CAN BE FELT, FINGERTIP TO FINGERTIP
SUBSTANCE_ABUSE: 1
VISUAL DISTURBANCES: NOT PRESENT
VISUAL DISTURBANCES: NOT PRESENT
TOTAL SCORE: 4
ORIENTATION AND CLOUDING OF SENSORIUM: ORIENTED AND CAN DO SERIAL ADDITIONS
ANXIETY: NO ANXIETY (AT EASE)
NAUSEA AND VOMITING: NO NAUSEA AND NO VOMITING
TREMOR: NO TREMOR
SUBSTANCE_ABUSE: 0
TREMOR: *
PAROXYSMAL SWEATS: NO SWEAT VISIBLE
VISUAL DISTURBANCES: NOT PRESENT
DOES PATIENT WANT TO STOP DRINKING: CANNOT ASSESS
HEADACHE, FULLNESS IN HEAD: VERY MILD
PAROXYSMAL SWEATS: NO SWEAT VISIBLE
ORIENTATION AND CLOUDING OF SENSORIUM: ORIENTED AND CAN DO SERIAL ADDITIONS
ANXIETY: NO ANXIETY (AT EASE)
VISUAL DISTURBANCES: NOT PRESENT
PAROXYSMAL SWEATS: NO SWEAT VISIBLE
AGITATION: NORMAL ACTIVITY
ANXIETY: MILDLY ANXIOUS
HAVE PEOPLE ANNOYED YOU BY CRITICIZING YOUR DRINKING: YES
HEADACHE, FULLNESS IN HEAD: NOT PRESENT
AUDITORY DISTURBANCES: NOT PRESENT
TOTAL SCORE: 3
ANXIETY: NO ANXIETY (AT EASE)
HEADACHE, FULLNESS IN HEAD: NOT PRESENT
ORIENTATION AND CLOUDING OF SENSORIUM: ORIENTED AND CAN DO SERIAL ADDITIONS
ALCOHOL_USE: YES
AUDITORY DISTURBANCES: NOT PRESENT
VISUAL DISTURBANCES: NOT PRESENT
NAUSEA AND VOMITING: NO NAUSEA AND NO VOMITING
ORIENTATION AND CLOUDING OF SENSORIUM: ORIENTED AND CAN DO SERIAL ADDITIONS
ANXIETY: MILDLY ANXIOUS
AGITATION: NORMAL ACTIVITY
AGITATION: NORMAL ACTIVITY
HEADACHE, FULLNESS IN HEAD: NOT PRESENT
TREMOR: *
NAUSEA AND VOMITING: NO NAUSEA AND NO VOMITING
TOTAL SCORE: 4
ANXIETY: NO ANXIETY (AT EASE)
TOTAL SCORE: 0
ANXIETY: MILDLY ANXIOUS
NAUSEA AND VOMITING: NO NAUSEA AND NO VOMITING
HEADACHE, FULLNESS IN HEAD: NOT PRESENT
HEADACHE, FULLNESS IN HEAD: NOT PRESENT
HAVE YOU EVER FELT YOU SHOULD CUT DOWN ON YOUR DRINKING: YES
HEADACHE, FULLNESS IN HEAD: NOT PRESENT
VISUAL DISTURBANCES: NOT PRESENT
PAROXYSMAL SWEATS: NO SWEAT VISIBLE
TREMOR: TREMOR NOT VISIBLE BUT CAN BE FELT, FINGERTIP TO FINGERTIP
PAROXYSMAL SWEATS: NO SWEAT VISIBLE
NAUSEA AND VOMITING: NO NAUSEA AND NO VOMITING
TOTAL SCORE: 1
PAROXYSMAL SWEATS: NO SWEAT VISIBLE
NAUSEA AND VOMITING: NO NAUSEA AND NO VOMITING
HEADACHE, FULLNESS IN HEAD: NOT PRESENT
ORIENTATION AND CLOUDING OF SENSORIUM: ORIENTED AND CAN DO SERIAL ADDITIONS
ON A TYPICAL DAY WHEN YOU DRINK ALCOHOL HOW MANY DRINKS DO YOU HAVE: 1
TOTAL SCORE: 2
AGITATION: NORMAL ACTIVITY
TOTAL SCORE: 1
AUDITORY DISTURBANCES: NOT PRESENT
PAROXYSMAL SWEATS: NO SWEAT VISIBLE
AGITATION: NORMAL ACTIVITY
ANXIETY: NO ANXIETY (AT EASE)
AUDITORY DISTURBANCES: NOT PRESENT
ORIENTATION AND CLOUDING OF SENSORIUM: ORIENTED AND CAN DO SERIAL ADDITIONS
AVERAGE NUMBER OF DAYS PER WEEK YOU HAVE A DRINK CONTAINING ALCOHOL: 7
AGITATION: NORMAL ACTIVITY
CONSUMPTION TOTAL: POSITIVE
VISUAL DISTURBANCES: NOT PRESENT
AUDITORY DISTURBANCES: NOT PRESENT
NAUSEA AND VOMITING: NO NAUSEA AND NO VOMITING
PAROXYSMAL SWEATS: NO SWEAT VISIBLE
AUDITORY DISTURBANCES: NOT PRESENT
TOTAL SCORE: 3
AGITATION: NORMAL ACTIVITY
ORIENTATION AND CLOUDING OF SENSORIUM: ORIENTED AND CAN DO SERIAL ADDITIONS
TOTAL SCORE: 4

## 2020-01-01 ASSESSMENT — COGNITIVE AND FUNCTIONAL STATUS - GENERAL
TURNING FROM BACK TO SIDE WHILE IN FLAT BAD: A LITTLE
HELP NEEDED FOR BATHING: A LITTLE
MOBILITY SCORE: 18
STANDING UP FROM CHAIR USING ARMS: A LITTLE
WALKING IN HOSPITAL ROOM: A LITTLE
MOBILITY SCORE: 19
STANDING UP FROM CHAIR USING ARMS: A LITTLE
SUGGESTED CMS G CODE MODIFIER MOBILITY: CK
WALKING IN HOSPITAL ROOM: A LITTLE
MOVING FROM LYING ON BACK TO SITTING ON SIDE OF FLAT BED: A LITTLE
CLIMB 3 TO 5 STEPS WITH RAILING: A LITTLE
DAILY ACTIVITIY SCORE: 19
MOVING FROM LYING ON BACK TO SITTING ON SIDE OF FLAT BED: A LITTLE
SUGGESTED CMS G CODE MODIFIER DAILY ACTIVITY: CK
SUGGESTED CMS G CODE MODIFIER MOBILITY: CK
DRESSING REGULAR LOWER BODY CLOTHING: A LITTLE
TOILETING: A LITTLE
SUGGESTED CMS G CODE MODIFIER DAILY ACTIVITY: CJ
SUGGESTED CMS G CODE MODIFIER MOBILITY: CK
MOVING FROM LYING ON BACK TO SITTING ON SIDE OF FLAT BED: A LITTLE
CLIMB 3 TO 5 STEPS WITH RAILING: A LITTLE
MOVING TO AND FROM BED TO CHAIR: A LITTLE
PERSONAL GROOMING: A LITTLE
CLIMB 3 TO 5 STEPS WITH RAILING: A LITTLE
STANDING UP FROM CHAIR USING ARMS: A LITTLE
MOBILITY SCORE: 19
DRESSING REGULAR UPPER BODY CLOTHING: A LITTLE
HELP NEEDED FOR BATHING: A LITTLE
DRESSING REGULAR LOWER BODY CLOTHING: A LITTLE
DAILY ACTIVITIY SCORE: 21
MOVING TO AND FROM BED TO CHAIR: A LITTLE
MOVING TO AND FROM BED TO CHAIR: A LITTLE
WALKING IN HOSPITAL ROOM: A LITTLE
TOILETING: A LITTLE

## 2020-01-01 ASSESSMENT — GAIT ASSESSMENTS
ASSISTIVE DEVICE: FRONT WHEEL WALKER
GAIT LEVEL OF ASSIST: SUPERVISED
DISTANCE (FEET): 150
GAIT LEVEL OF ASSIST: MINIMAL ASSIST
DISTANCE (FEET): 100
ASSISTIVE DEVICE: HAND HELD ASSIST
DEVIATION: DECREASED BASE OF SUPPORT;SHUFFLED GAIT;DECREASED TOE OFF;DECREASED HEEL STRIKE
DEVIATION: BRADYKINETIC;DECREASED BASE OF SUPPORT

## 2020-01-01 ASSESSMENT — PATIENT HEALTH QUESTIONNAIRE - PHQ9
SUM OF ALL RESPONSES TO PHQ9 QUESTIONS 1 AND 2: 0
2. FEELING DOWN, DEPRESSED, IRRITABLE, OR HOPELESS: NOT AT ALL
1. LITTLE INTEREST OR PLEASURE IN DOING THINGS: NOT AT ALL

## 2020-01-01 ASSESSMENT — FIBROSIS 4 INDEX
FIB4 SCORE: 22.22
FIB4 SCORE: 15.67
FIB4 SCORE: 12.21

## 2020-01-01 ASSESSMENT — ACTIVITIES OF DAILY LIVING (ADL): TOILETING: INDEPENDENT
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
## 2020-10-05 PROBLEM — F10.20 ALCOHOL USE DISORDER, SEVERE, DEPENDENCE (HCC): Status: ACTIVE | Noted: 2020-01-01

## 2020-10-05 PROBLEM — E87.6 HYPOKALEMIA: Status: ACTIVE | Noted: 2020-01-01

## 2020-10-05 PROBLEM — K70.10 ALCOHOLIC HEPATITIS: Status: ACTIVE | Noted: 2020-01-01

## 2020-10-05 PROBLEM — D64.9 ANEMIA: Status: ACTIVE | Noted: 2020-01-01

## 2020-10-05 PROBLEM — R74.8 ABNORMAL SERUM LEVEL OF LIPASE: Status: ACTIVE | Noted: 2020-01-01

## 2020-10-05 PROBLEM — R29.898 WEAKNESS OF BOTH LOWER EXTREMITIES: Status: ACTIVE | Noted: 2020-01-01

## 2020-10-05 PROBLEM — D69.6 THROMBOCYTOPENIA (HCC): Status: ACTIVE | Noted: 2020-01-01

## 2020-10-05 PROBLEM — N17.9 AKI (ACUTE KIDNEY INJURY) (HCC): Status: ACTIVE | Noted: 2020-01-01

## 2020-10-05 PROBLEM — E87.1 HYPONATREMIA: Status: ACTIVE | Noted: 2020-01-01

## 2020-10-05 PROBLEM — K70.30 ALCOHOLIC CIRRHOSIS (HCC): Status: ACTIVE | Noted: 2020-01-01

## 2020-10-05 NOTE — ASSESSMENT & PLAN NOTE
Moderate thrombocytopenia, no evidence of acute bleeding.  History of upper GI bleed.  Secondary to alcohol use, liver versus bone marrow effect.  No acute evidence/need for repletion.  -Follow-up regarding reticulocyte count

## 2020-10-05 NOTE — PROGRESS NOTES
Triage Officer Note    35 year old male with weakness, likely due to new onset liver failure from chronic drinking with electrolyte abnormalities, unable to ambulate at this time.     Patient was signed out [facility] Senior Dr. Davila who will assume care of this patient at this time.

## 2020-10-05 NOTE — ASSESSMENT & PLAN NOTE
Acute worsening bilateral lower extremity weakness with associated paresthesia.  Sensation intact on physical exam, normal reflexes and strength on exam. No trauma noted. CT head unremarkable.  -B12/folate for subacute combined degeneration  -PT/OT  -Consider additional imaging if symptoms fail to improve

## 2020-10-05 NOTE — THERAPY
Physical Therapy   Initial Evaluation     Patient Name: Frank Yee  Age:  35 y.o., Sex:  male  Medical Record #: [id number]  Today's Date: 10/5/2020     Precautions: Fall Risk    Assessment  Patient is 35 y.o. male presenting acutely with c/o BLE weakness. PMH includes alcohol abuse. Pt demonstrates BLE strength WFL, he reports intact sensation now in BLE but reports pain in anterior thighs bilaterally. Pt with impaired balance when upright with standing/amb. He demonstrates occasional LOB during gait due to narrow KURT. Recommend more support from AD such as FWW as pt demonstrated improvement in balance when offered HHA. PT to follow in acute setting.     Plan    Recommend Physical Therapy 3 times per week until therapy goals are met for the following treatments:  Community Re-integration, Gait Training, Neuro Re-Education / Balance, Stair Training, Therapeutic Activities and Therapeutic Exercises    DC Equipment Recommendations: Front-Wheel Walker  Discharge Recommendations: Recommend outpatient physical therapy services to address higher level deficits     Objective     10/05/20 1205   Prior Living Situation   Prior Services None   Housing / Facility 1 Story House   Steps Into Home 4   Equipment Owned None   Lives with - Patient's Self Care Capacity Significant Other   Comments Pt states his boyfriend will assist him with standing up if needed however SO works as well and is not home all the time   Prior Level of Functional Mobility   Bed Mobility Independent   Transfer Status Independent   Ambulation Independent   Distance Ambulation (Feet) (community distances)   Assistive Devices Used None   Stairs Independent   Comments 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 He states numbness has been going on for approx. 1 month in legs, weakness onset about 1 wk ago   Active ROM Lower Body    Active ROM Lower Body  WDL   Strength Lower Body   Lower Body Strength  WDL   Sensation Lower Body   Lower Extremity  Sensation   WDL   Comments no reports of numbness, pain in anterior thighs   Balance Assessment   Sitting Balance (Static) Good   Sitting Balance (Dynamic) Good   Standing Balance (Static) Fair   Standing Balance (Dynamic) Fair -   Gait Analysis   Gait Level Of Assist Minimal Assist   Assistive Device Hand Held Assist   Distance (Feet) 100   Deviation Decreased Base Of Support;Shuffled Gait;Decreased Toe Off;Decreased Heel Strike  (decreased heel strike/ankle DF for optimal foot clearance)   # of Stairs Climbed 0   Weight Bearing Status FWB   Comments Recommend trialing FWW next visit   Bed Mobility    Supine to Sit Supervised   Sit to Supine Supervised   Functional Mobility   Sit to Stand Supervised   Bed, Chair, Wheelchair Transfer Minimal Assist (via HHA)   Short Term Goals    Short Term Goal # 1 Pt will perform all fxnl transfers with LRAD and SPV within 6 visit to negotiate transfers at home safely.   Short Term Goal # 2 Pt will amb x300ft with LRAD and SPV within 6 visits to negotiate community distances safely at MA.   Short Term Goal # 3 Pt will ascend/descend 4 steps with UHR Hold and SPV within 6 visits to enter/exit home safely at MA.

## 2020-10-05 NOTE — THERAPY
Occupational Therapy   Initial Evaluation     Patient Name: Frank Yee  Age:  35 y.o., Sex:  male  Medical Record #: [id number]  Today's Date: 10/5/2020     Precautions  Precautions: (P) Fall Risk    Assessment  Patient is 35 y.o. male with a diagnosis of ETOH hepatitis.  Pt currently limited by decreased functional mobility, activity tolerance,  balance, and pain which are affecting pt's ability to complete ADLs/IADLs at baseline. Pt would benefit from OT services in the acute care setting to maximize functional recovery.       Plan    Recommend Occupational Therapy 3 times per week until therapy goals are met for the following treatments:  Self Care/Activities of Daily Living and Therapeutic Activities.       Discharge Recommendations: (P) Recommend home health for continued occupational therapy services        10/05/20 1156   Prior Living Situation   Prior Services None   Housing / Facility 1 Story Apartment / Condo   Equipment Owned None   Lives with - Patient's Self Care Capacity Significant Other   Prior Level of ADL Function   Self Feeding Independent   Grooming / Hygiene Independent   Bathing Independent   Dressing Independent   Toileting Independent   ADL Assessment   Grooming Supervision   Upper Body Dressing Supervision   Lower Body Dressing Minimal Assist   Functional Mobility   Sit to Stand Minimal Assist   Bed, Chair, Wheelchair Transfer Minimal Assist   Short Term Goals   Short Term Goal # 1 supervised with LB dressing   Short Term Goal # 2 supervised with ADL txfs

## 2020-10-05 NOTE — ED NOTES
Med rec updated and complete. Allergies reviewed. Pt is not currently taking any medications.     Home pharmacy  [facility]

## 2020-10-05 NOTE — ED TRIAGE NOTES
"Chief Complaint   Patient presents with   • Weakness   • Numbness   • Peripheral Edema     PT reports numbness in both legs with increasinf weakness and swelling in both legs over the past week.  PT now not able to walk without standby assist.     Blood Pressure 100/60   Pulse (Abnormal) 116   Temperature 37.2 °C (99 °F) (Temporal)   Respiration 16   Height 1.778 m (5' 10\")   Weight 95 kg (209 lb 7 oz)   Oxygen Saturation 98%   Body Mass Index 30.05 kg/m²     "

## 2020-10-05 NOTE — ASSESSMENT & PLAN NOTE
Likely secondary to prerenal causes w/ hepatorenal contributions.  Improved with initial 500 cc LR bolus.  -Repeat 500 cc LR bolus  -Ultrasound kidney pending
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
## 2020-10-05 NOTE — ASSESSMENT & PLAN NOTE
Current drinker, positive EMILIE at admission.  AST: ALT>2, no current evidence of withdrawal. Rally bag, vitamins given.  -Seizure, aspiration, fall precautions  -Currently n.p.o.  -Consider CIWA over next 24-48hrs

## 2020-10-05 NOTE — PROGRESS NOTES
Received report from ER RN, pt care assumed, VSS, pt assessment complete. Pt AAOx4, 0/10 pain at this time. No signs of acute distress noted at this time. POC discussed with pt and verbalizes no questions. Pt denies any additional needs at this time. Bed in lowest position, bed alarm in place, pt educated on fall risk and verbalized understanding, call light within reach, hourly rounding initiated.

## 2020-10-05 NOTE — ASSESSMENT & PLAN NOTE
Likely due to intake as well as renal losses.  Replating via p.o. potassium  -Urinalysis and urine lites pending  -Replete as appropriate

## 2020-10-05 NOTE — ED NOTES
Patient transported up to floor via ACLs RN on zoll. All belongings sent with patient. Bedside report completed with tele RN.

## 2020-10-05 NOTE — ASSESSMENT & PLAN NOTE
Presents with progressive jaundice, encephalopathy, in the setting of alcohol use disorder, with heavy current alcohol use.  Positive EMILIE on admission, labs notable for elevated PT, INR, bilirubin, ammonia, AST: ALT >2.  Mandery 40.3 which equals poor prognosis, meld 33 = 52.6% 3mo mortality. Lille score unable to calculate as no previous labwork.  Steroids not additionally given due to concern for possible pancreatitis and history of GI bleed.  -Hepatitis panel, acetaminophen, salicylates ordered  -Consider steroids, if FOBT negative  -Ultrasound abdomen complete with elastography pending  -Consult GI in a.m.    -
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
## 2020-10-05 NOTE — CARE PLAN
Problem: Communication  Goal: The ability to communicate needs accurately and effectively will improve  Outcome: PROGRESSING AS EXPECTED     Problem: Safety  Goal: Will remain free from injury  Outcome: PROGRESSING AS EXPECTED  Goal: Will remain free from falls  Outcome: PROGRESSING AS EXPECTED     Problem: Bowel/Gastric:  Goal: Normal bowel function is maintained or improved  Outcome: PROGRESSING AS EXPECTED  Goal: Will not experience complications related to bowel motility  Outcome: PROGRESSING AS EXPECTED     Problem: Fluid Volume:  Goal: Will maintain balanced intake and output  Outcome: PROGRESSING AS EXPECTED

## 2020-10-05 NOTE — ASSESSMENT & PLAN NOTE
Elevated lipase on admission. No abdominal tenderness.   -N.p.o., receiving IV fluids.  -Ultrasound abdomen pending,   -consider additional imaging if necessary and additional lab work-up if appropriate

## 2020-10-05 NOTE — ASSESSMENT & PLAN NOTE
Serum osmolarity returned hypertonic, glucose within normal limits. Gave LR 500cc bolus w/minimal improvement.  -Repeat 500cc LR bolus, consider starting salt tabs  -Check repeat labs  -Lipid panel ordered  -Urine osmolality, urine lites, UA ordered    Possible beer potomania  -F/u on urine lytes for calculating FeNA  -Repeat 1L NS bolus  -Start NS@75

## 2020-10-05 NOTE — ASSESSMENT & PLAN NOTE
Normocytic anemia, likely multifactorial from alcohol use leading to liver, bone marrow, kidney sequela.  -Follow-up regarding iron studies, reticulocyte, haptoglobin, LDH, Titi, B12, Folate

## 2020-10-06 PROBLEM — R74.8 ABNORMAL SERUM LEVEL OF LIPASE: Status: RESOLVED | Noted: 2020-01-01 | Resolved: 2020-01-01

## 2020-10-06 PROBLEM — N17.9 AKI (ACUTE KIDNEY INJURY) (HCC): Status: RESOLVED | Noted: 2020-01-01 | Resolved: 2020-01-01

## 2020-10-06 PROBLEM — E87.6 HYPOKALEMIA: Status: RESOLVED | Noted: 2020-01-01 | Resolved: 2020-01-01

## 2020-10-06 NOTE — DISCHARGE SUMMARY
Discharge Summary    Date of Admission: 10/4/2020  Date of Discharge: 10/6/2020  Discharging Attending: Chantell Bethea M.D.   Discharging Senior Resident: Dr. Brito  Discharging Intern: Dr. Edgar    CHIEF COMPLAINT ON ADMISSION  Chief Complaint   Patient presents with   • Weakness   • Numbness   • Peripheral Edema     PT reports numbness in both legs with increasinf weakness and swelling in both legs over the past week.  PT now not able to walk without standby assist.       Reason for Admission  Compensated cirrhosis    Admission Date  10/4/2020    CODE STATUS  Full Code    HPI & HOSPITAL COURSE  This is a 35 y.o. male here with chronic alcohol abuse who presented with numbness and swelling of bilateral lower extremities over the last week. He drinks 6 pints of hard liquor daily for the last two years. He has progressive jaundice and daily nausea and vomiting over the last five months. He has had dark colored urine for the last year and a half. He was found to be tachycardic. Labs showed elevated AST//26, INR 1.63, T bili 13.2, lipase 691, Cr 1.44 and anion gap acidosis. He was given IV thiamine and folic acid. He did not have any signs of decompensated cirrhosis. For his acute kidney injury he was given bolus fluids and improved. His MELD score is 33 giving him 60% mortality in the next three months. His prognosis was discussed with the patient and his partner. He was advised to quit alcohol which he is not ready to do yet. He remained tachycardic throughout his stay which did not improve after several fluid boluses. He did have elevated lipase but did not have abdominal pain. He was advised to stay to potentially do CT abdomen to look at his pancreas but he declined. He could have been withdrawing from alcohol causing the tachycardia, but given he does not want to quit drinking his hospitalization was not prolonged for WA. He was discharged with thiamine, folate and multivitamin. He was euvolemic on  discharge with dry weight 210 pounds.     Therefore, he is discharged in good and stable condition to home with close outpatient follow-up.    The patient met 2-midnight criteria for an inpatient stay at the time of discharge.    Discharge Date  10/6/2020    FOLLOW UP ITEMS POST DISCHARGE  Follow up with PCP for post hospital follow up    DISCHARGE DIAGNOSES  Principal Problem:    Advanced Alcoholic cirrhosis (HCC) POA: Yes  Active Problems:    Thrombocytopenia (HCC) POA: Yes      Overview:                 Alcohol use disorder, severe, dependence (HCC) POA: Yes    Hyponatremia POA: Yes      Overview: improving    Weakness of both lower extremities POA: Yes    Anemia POA: Yes    Abnormal serum level of lipase POA: Yes  Resolved Problems:    NANCY (acute kidney injury) (HCC) POA: Yes    Hypokalemia POA: Yes      FOLLOW UP  No future appointments.  [facility]  [address] [place] [id number]  [phone], ext. [phone]  Schedule an appointment as soon as possible for a visit  Please call to establish with a Primary Care Physicain and schedule your hospital follow up. Thank you.      MEDICATIONS ON DISCHARGE     Medication List      START taking these medications      Instructions   folic acid 1 MG Tabs  Start taking on: October 7, 2020  Commonly known as: FOLVITE   Take 1 Tab by mouth every day.  Dose: 1 mg     multivitamin Tabs  Start taking on: October 7, 2020   Take 1 Tab by mouth every day.  Dose: 1 Tab     thiamine 100 MG tablet  Start taking on: October 7, 2020  Commonly known as: THIAMINE   Take 1 Tab by mouth every day.  Dose: 100 mg            Allergies  No Known Allergies    DIET  Orders Placed This Encounter   Procedures   • Diet Order Cardiac     Standing Status:   Standing     Number of Occurrences:   1     Order Specific Question:   Diet:     Answer:   Cardiac [6]       ACTIVITY  As tolerated.  Weight bearing as tolerated

## 2020-10-06 NOTE — FACE TO FACE
Face to Face Supporting Documentation - Home Health    The encounter with this patient was in whole or in part the primary reason for home health admission.    Date of encounter:   Patient:                    MRN:                       YOB: 2020  Frank Yee  [id number]  1985     Home health to see patient for:  Physical Therapy evaluation and treatment and Occupational therapy evaluation and treatment    Skilled need for:  Comment: not applicable    Skilled nursing interventions to include:  Comment: not applicable    Homebound evidenced status by:  Needs the assistance of another person in order to leave the home. Leaving home must require a considerable and taxing effort. There must exist a normal inability to leave the home.    Community Physician to provide follow up care: Pcp Pt States None     Optional Interventions    Wound information & treatment:    Home Infusion Therapy orders:    Line/Drain/Airway:    I certify the face to face encounter for this home care referral meets the CMS requirements and the encounter/clinical assessment with the patient was, in whole, or in part, for the medical condition(s) listed above, which is the primary reason for home health care. Based on my clinical findings: the service(s) are medically necessary, support the need for home health care, and the homebound criteria are met.  I certify that this patient has had a face to face encounter by myself.  Kaylee Edgar M.D. - NPI: [id number]    *Debility, frailty and advanced age in the absence of an acute deterioration or exacerbation of a condition do not qualify a patient for home health.

## 2020-10-06 NOTE — CARE PLAN
Problem: Communication  Goal: The ability to communicate needs accurately and effectively will improve  Outcome: PROGRESSING AS EXPECTED     Problem: Safety  Goal: Will remain free from injury  Outcome: PROGRESSING AS EXPECTED  Goal: Will remain free from falls  Outcome: PROGRESSING AS EXPECTED  Intervention: Implement fall precautions  Flowsheets (Taken 10/5/2020 1536)  Bed Alarm: Yes - Alarm On  Chair/Bed Strip Alarm: Yes - Alarm On  Note: Patient attempted to get out of bed without calling for assistance and the fall alarm activated, patient reminded to call for assistance when getting out of bed, patient verbalizes understanding.

## 2020-10-06 NOTE — PROGRESS NOTES
Bedside report received, patient A&Ox4, pain level assessed, laboratory results and recent H&P notes reviewed, patient updated on plan of care, boyfriend at bedside and updated on plan of care.

## 2020-10-06 NOTE — DISCHARGE PLANNING
Anticipated Discharge Disposition: Home w/ OP Therapy    Action: Dr. Edgar spoke with RN CM and stated she had put in referrals for HH w/ PT/OT.  Explained to MD that d/t patient's insurance, getting HH set up for him in [place] is going to be very difficult.  Asked if he would be appropriate for OP PT/OT and she stated that he would be.  She provided written script for OP PT/OT and written script was provided to BS TIAN Rivera.    Barriers to Discharge: none    Plan: RN CM available for any further dc planning needs.

## 2020-10-06 NOTE — DISCHARGE PLANNING
"Care Transition Team Assessment    Emergency Contact, Jaime Dorsey, significant other, [phone]    RN CM spoke with patient at the bedside to complete transition assessment.  Patient verified information on face sheet as correct.  Patient states he is not sure who he would want to be his decision maker at this time.  Explained that because he and his boyfriend are not , it would be a family member.  Explained that if he wants to designate his boyfriend as the decision maker, he can.  Provided him with an AD booklet and explained how he would go about filling it out if he chooses to do so.  He said he would think about it.  He states he is normally independent for his ADLs/IADLs.  He states he takes no normal medications and therefore is not established with a pharmacy, but states he would use one in [place] if needed.  Discussed about options for meds on discharge and he was agreeable to having meds to beds utilized.  When discussing alcohol abuse, patient states that he drinks \"a lot\" of alcohol every day.  He was not interested in any alcohol cessation information.      DC Plan:  Home w/ No needs    Information Source  Orientation : Oriented x 4  Information Given By: Patient  Who is responsible for making decisions for patient? : Patient    Readmission Evaluation  Is this a readmission?: No    Elopement Risk  Legal Hold: No  Ambulatory or Self Mobile in Wheelchair: Yes  Disoriented: No  Psychiatric Symptoms: None  History of Wandering: No  Elopement this Admit: No  Vocalizing Wanting to Leave: No  Displays Behaviors, Body Language Wanting to Leave: No-Not at Risk for Elopement  Elopement Risk: Not at Risk for Elopement    Interdisciplinary Discharge Planning  Primary Care Physician: [facility] in [place]  Lives with - Patient's Self Care Capacity: Significant Other  Patient or legal guardian wants to designate a caregiver: No  Support Systems: Family Member(s), Friends / Neighbors, Spouse / " "Significant Other  Housing / Facility: [address] [facility]  Do You Take your Prescribed Medications Regularly: Yes  Able to Return to Previous ADL's: Yes  Mobility Issues: No  Prior Services: None  Patient Prefers to be Discharged to:: Home  Assistance Needed: No  Durable Medical Equipment: Not Applicable    Discharge Preparedness  What is your plan after discharge?: Home with help  What are your discharge supports?: Partner  Prior Functional Level: Ambulatory, Drives Self, Independent with Activities of Daily Living, Independent with Medication Management  Difficulity with ADLs: None  Difficulity with IADLs: None    Functional Assesment  Prior Functional Level: Ambulatory, Drives Self, Independent with Activities of Daily Living, Independent with Medication Management    Finances  Financial Barriers to Discharge: No  Prescription Coverage: Yes    Vision / Hearing Impairment  Vision Impairment : No  Hearing Impairment : No         Advance Directive  Advance Directive?: None  Advance Directive offered?: AD Booklet given    Domestic Abuse  Have you ever been the victim of abuse or violence?: No  Physical Abuse or Sexual Abuse: No  Verbal Abuse or Emotional Abuse: No  Possible Abuse/Neglect Reported to:: Not Applicable    Psychological Assessment  History of Substance Abuse: Alcohol  Date Last Used - Alcohol: 10/04/2020  Substance Abuse Comments: drinks \"a lot\" of alcohol every day  History of Psychiatric Problems: No  Non-compliant with Treatment: No  Newly Diagnosed Illness: Yes    Discharge Risks or Barriers  Discharge risks or barriers?: No  Patient risk factors: Substance abuse    Anticipated Discharge Information  Discharge Disposition: Discharged to home/self care (01)  Discharge Address: [address] [place] [id number]  Discharge Contact Phone Number: [phone]        "

## 2020-10-06 NOTE — PROGRESS NOTES
Report received at bedside from NOC RN, pt care assumed, tele box on. Pt sleeping. Bed in lowest position, call light within reach, bed alarm plugged in and on.  CIWA protocol in place.

## 2020-10-06 NOTE — CARE PLAN
Problem: Infection  Goal: Will remain free from infection  Outcome: PROGRESSING AS EXPECTED  Standard precautions in place for pt and educated on the importance of hand hygiene and self care. Pt educated on assessing for signs and symptoms of infection and precautions to reduce risk. Verbalized understanding and no additional questions at this time.       Problem: Fluid Volume:  Goal: Will maintain balanced intake and output  Outcome: PROGRESSING AS EXPECTED  Patients I&O's being strictly monitored and recorded. Patient understands teaching and denies additional questions at this time.

## 2020-10-07 NOTE — DISCHARGE INSTRUCTIONS
Discharge Instructions    Please follow up with your PCP for continued medical care.  Please follow up with gastroenterology outpatient (referral with [facility]) for further treatment of your advanced cirrhosis.  Please seriously consider alcohol cessation (stop drinking alcohol).    Discharged to home by car with friend. Discharged via wheelchair, hospital escort: Yes.  Special equipment needed: Walker    Be sure to schedule a follow-up appointment with your primary care doctor or any specialists as instructed.     Discharge Plan:   Diet Plan: Discussed  Activity Level: Discussed  Influenza Vaccine Indication: Indicated: 9 to 64 years of age    I understand that a diet low in cholesterol, fat, and sodium is recommended for good health. Unless I have been given specific instructions below for another diet, I accept this instruction as my diet prescription.   Other diet: Cardiac    Special Instructions: None    · Is patient discharged on Warfarin / Coumadin?   No       Cirrhosis    Cirrhosis is long-term (chronic) liver injury. The liver is the body's largest internal organ, and it performs many functions. It converts food into energy, removes toxic material from the blood, makes important proteins, and absorbs necessary vitamins from food.  In cirrhosis, healthy liver cells are replaced by scar tissue. This prevents blood from flowing through the liver, making it difficult for the liver to function. Scarring of the liver cannot be reversed, but treatment can prevent it from getting worse.  What are the causes?  Common causes of this condition are hepatitis C and long-term alcohol abuse. Other causes include:  · Nonalcoholic fatty liver disease. This happens when fat is deposited in the liver by causes other than alcohol.  · Hepatitis B infection.  · Autoimmune hepatitis. In this condition, the body's defense system (immune system) mistakenly attacks the liver cells, causing irritation and  swelling (inflammation).  · Diseases that cause blockage of ducts inside the liver.  · Inherited liver diseases, such as hemochromatosis. This is one of the most common inherited liver diseases. In this disease, deposits of iron collect in the liver and other organs.  · Reactions to certain long-term medicines, such as amiodarone, a heart medicine.  · Parasitic infections. These include schistosomiasis, which is caused by a flatworm.  · Long-term contact to certain toxins. These toxins include certain organic solvents, such as toluene and chloroform.  What increases the risk?  You are more likely to develop this condition if:  · You have certain types of viral hepatitis.  · You abuse alcohol, especially if you are female.  · You are overweight.  · You share needles.  · You have unprotected sex with someone who has viral hepatitis.  What are the signs or symptoms?  You may not have any signs and symptoms at first. Symptoms may not develop until the damage to your liver starts to get worse.  Early symptoms may include:  · Weakness and tiredness (fatigue).  · Changes in sleep patterns or having trouble sleeping.  · Itchiness.  · Tenderness in the right-upper part of your abdomen.  · Weight loss and muscle loss.  · Nausea.  · Loss of appetite.  · Appearance of tiny blood vessels under the skin.  Later symptoms may include:  · Fatigue or weakness that is getting worse.  · Yellow skin and eyes (jaundice).  · Buildup of fluid in the abdomen (ascites). You may notice that your clothes are tight around your waist.  · Weight gain.  · Swelling of the feet and ankles (edema).  · Trouble breathing.  · Easy bruising and bleeding.  · Vomiting blood.  · Black or bloody stool.  · Mental confusion.  How is this diagnosed?  Your health care provider may suspect cirrhosis based on your symptoms and medical history, especially if you have other medical conditions or a history of alcohol abuse. Your health care provider will do a  physical exam to feel your liver and to check for signs of cirrhosis. He or she may perform other tests, including:  · Blood tests to check:  ? For hepatitis B or C.  ? Kidney function.  ? Liver function.  · Imaging tests such as:  ? MRI or CT scan to look for changes seen in advanced cirrhosis.  ? Ultrasound to see if normal liver tissue is being replaced by scar tissue.  · A procedure in which a long needle is used to take a sample of liver tissue to be checked in a lab (biopsy). Liver biopsy can confirm the diagnosis of cirrhosis.  How is this treated?  Treatment for this condition depends on how damaged your liver is and what caused the damage. It may include treating the symptoms of cirrhosis, or treating the underlying causes in order to slow the damage. Treatment may include:  · Making lifestyle changes, such as:  ? Eating a healthy diet. You may need to work with your health care provider or a diet and nutrition specialist (dietitian) to develop an eating plan.  ? Restricting salt intake.  ? Maintaining a healthy weight.  ? Not abusing drugs or alcohol.  · Taking medicines to:  ? Treat liver infections or other infections.  ? Control itching.  ? Reduce fluid buildup.  ? Reduce certain blood toxins.  ? Reduce risk of bleeding from enlarged blood vessels in the stomach or esophagus (varices).  · Liver transplant. In this procedure, a liver from a donor is used to replace your diseased liver. This is done if cirrhosis has caused liver failure.  Other treatments and procedures may be done depending on the problems that you get from cirrhosis. Common problems include liver-related kidney failure (hepatorenal syndrome).  Follow these instructions at home:    · Take medicines only as told by your health care provider. Do not use medicines that are toxic to your liver. Ask your health care provider before taking any new medicines, including over-the-counter medicines.  · Rest as needed.  · Eat a well-balanced diet.  Ask your health care provider or dietitian for more information.  · Limit your salt or water intake, if your health care provider asks you to do this.  · Do not drink alcohol. This is especially important if you are taking acetaminophen.  · Keep all follow-up visits as told by your health care provider. This is important.  Contact a health care provider if you:  · Have fatigue or weakness that is getting worse.  · Develop swelling of the hands, feet, legs, or face.  · Have a fever.  · Develop loss of appetite.  · Have nausea or vomiting.  · Develop jaundice.  · Develop easy bruising or bleeding.  Get help right away if you:  · Vomit bright red blood or a material that looks like coffee grounds.  · Have blood in your stools.  · Notice that your stools appear black and tarry.  · Become confused.  · Have chest pain or trouble breathing.  Summary  · Cirrhosis is chronic liver injury. Liver damage cannot be reversed. Common causes are hepatitis C and long-term alcohol abuse.  · Tests used to diagnose cirrhosis include blood tests, imaging tests, and liver biopsy.  · Treatment for this condition involves treating the underlying cause. Avoid alcohol, drugs, salt, and medicines that may damage your liver.  · Contact your health care provider if you develop ascites, edema, jaundice, fever, nausea or vomiting, easy bruising or bleeding, or worsening fatigue.  This information is not intended to replace advice given to you by your health care provider. Make sure you discuss any questions you have with your health care provider.  Document Released: 12/18/2006 Document Revised: 04/08/2020 Document Reviewed: 11/07/2018  Elsevier Patient Education © 2020 [employer] Inc[employer]    Alcoholic Liver Disease    Alcoholic liver disease is liver damage that is caused by drinking a lot of alcohol for a long time. If you have this disease, you must stop drinking alcohol.  Follow these instructions at home:    · Do not drink alcohol. Follow your  treatment plan. Work with your doctor if you need help.  · Think about joining an alcohol support group.  · Take over-the-counter and prescription medicines only as told by your doctor. These include vitamins.  · Do not use medicines or eat foods that have alcohol in them, unless your doctor says that it is safe.  · Follow instructions from your doctor about eating a healthy diet.  · Keep all follow-up visits as told by your doctor. This is important.  Contact a doctor if:  · You get a fever.  · Your skin starts to look more yellow, pale, or dark.  · You get headaches.  Get help right away if:  · You throw up (vomit) blood.  · You have bright red blood in your poop (stool).  · Your poop looks black or looks like tar.  · You have trouble:  ? Thinking.  ? Walking.  ? Balancing.  ? Breathing.  Summary  · Alcoholic liver disease is liver damage that is caused by drinking a lot of alcohol for a long time.  · If you have this disease, you must stop drinking alcohol. Follow your treatment plan, and work with your doctor as needed.  · Think about joining an alcohol support group.  This information is not intended to replace advice given to you by your health care provider. Make sure you discuss any questions you have with your health care provider.  Document Released: 10/15/2010 Document Revised: 04/07/2020 Document Reviewed: 09/07/2018  Elsevier Patient Education © 2020 [employer]      Eating Plan for Hepatitis  The liver plays an important role in the digestion of food and the storage of nutrients. Chronic hepatitis infection affects how the liver functions and the way the body uses energy from food. It is important to follow an eating plan with enough calories and nutrients to support your body's needs when you have chronic hepatitis infection. This can help to protect your liver and prevent complications from your condition.  Work with your health care provider or a diet and nutrition specialist (dietitian) to make  an eating plan that meets your specific needs.  What are tips for following this plan?  Reading food labels  · Check food labels for the amount of salt (sodium), fat, sugar, and protein.  Shopping  · Avoid processed foods.  Cooking  · Do not add extra salt to food when cooking.  · [place], boil, or bake foods instead of frying them. Do not use pots and pans made of iron.  · Maintain a clean space to prepare and eat your meals. Wash your hands before and after preparing and eating your food.  Meal planning  · Try eating 4-6 small meals instead of 3 large meals each day.  · Limit foods that are high in fat, sugar, and sodium.  · Do not eat uncooked shellfish.  · If you are underweight, your health care provider may recommend eating more calories.  Lifestyle    · Do not drink alcohol.  · Avoid dehydration by drinking enough fluid to keep your urine pale yellow. Limit your fluid intake only if your health care provider tells you to do so.  General information  · Follow your health care provider's recommendations for how much you should get of the following nutrients each day:  ? Sodium (mg/day): ________  ? Fluids (oz/day): ________  ? Protein (oz/day): ________  ? Vitamin and mineral supplements: ________________________  · Take vitamin and mineral supplements only as told by your health care provider or dietitian.  What foods can I eat?    Fruits  Apples, bananas, pears, apricots, grapes, and cherries. Canned fruit (in juice or water).  Vegetables  Carrots, broccoli, cabbage, celery, cucumbers, and potatoes. Frozen vegetables. Low-sodium canned vegetables.  Grains  Whole-grain bread, tortillas, cereals, and pasta. Brown rice. Oats and oatmeal.  Meats and other proteins  Lean meat and poultry. Fish. Tofu. Eggs. Nuts and nut butters. Beans.  Dairy  Low-fat yogurt. Low-fat cottage cheese. Low-fat cheese. Milkshakes.  Beverages  Water. Low-fat milk. 100% fruit juice. Low-sodium vegetable juice. Smoothies. Herbal  tea.  Condiments  Low-fat mayonnaise. Low-sodium soy sauce.  Sweets and desserts  Low-fat cookies. Low-fat bran muffins.  Fats and oils  Canola oil, olive oil, corn oil, sunflower oil, peanut oil, and flaxseed oil.  Other  Baked chips. Whole-grain crackers. Powdered protein supplements (check label for sodium, fat, and iron content).  The items listed above may not be a complete list of foods and beverages you can eat. Contact a dietitian for more options.  What foods should I avoid?  Fruits  Raisins.  Vegetables  Fermented vegetables, such as sauerkraut and pickles. Canned vegetables that are high in sodium. Dark green leafy vegetables, such as spinach and kale.  Grains  Iron-fortified cereals and breads.  Meats and other proteins  Red meat. Pork. Chicken. Shellfish. Fish. Beans. Salted nuts. Salted or cured meats.  Condiments  Ketchup. Mustard. Barbecue sauce.  Fats and oils  Animal fats, such as butter, lard, or ghee. Fruit oils, such as coconut oil or avocado oil.  Other  Salted snacks, such as potato chips or pretzels. Canned soups. Frozen dinners. Processed foods. Multivitamins and supplements that contain iron.  The items listed above may not be a complete list of foods and beverages you should avoid. Contact a dietitian for more information.  Summary  · If you have chronic hepatitis, adjusting your food choices can help to protect your liver and prevent further complications.  · Make adjustments such as limiting foods that are high in fat, sugar, and sodium.  · Other dietary tips include not eating uncooked shellfish, not drinking alcohol, and taking vitamin and mineral supplements only as directed by your health care provider or dietitian.  This information is not intended to replace advice given to you by your health care provider. Make sure you discuss any questions you have with your health care provider.  Document Released: 12/18/2006 Document Revised: 04/09/2020 Document Reviewed: 12/19/2018  [employer]  Patient Education © 2020 [employer] Inc.      Alcohol Use Disorder  Alcohol use disorder is when your drinking disrupts your daily life. When you have this condition, you drink too much alcohol and you cannot control your drinking.  Alcohol use disorder can cause serious problems with your physical health. It can affect your brain, heart, liver, pancreas, immune system, stomach, and intestines. Alcohol use disorder can increase your risk for certain cancers and cause problems with your mental health, such as depression, anxiety, psychosis, delirium, and dementia. People with this disorder risk hurting themselves and others.  What are the causes?  This condition is caused by drinking too much alcohol over time. It is not caused by drinking too much alcohol only one or two times. Some people with this condition drink alcohol to cope with or escape from negative life events. Others drink to relieve pain or symptoms of mental illness.  What increases the risk?  You are more likely to develop this condition if:  · You have a family history of alcohol use disorder.  · Your culture encourages drinking to the point of intoxication, or makes alcohol easy to get.  · You had a mood or conduct disorder in childhood.  · You have been a victim of abuse.  · You are an adolescent and:  ? You have poor grades or difficulties in school.  ? Your caregivers do not talk to you about saying no to alcohol, or supervise your activities.  ? You are impulsive or you have trouble with self-control.  What are the signs or symptoms?  Symptoms of this condition include:  · Drinking more than you want to.  · Drinking for longer than you want to.  · Trying several times to drink less or to control your drinking.  · Spending a lot of time getting alcohol, drinking, or recovering from drinking.  · Craving alcohol.  · Having problems at work, at school, or at home due to drinking.  · Having problems in relationships due to drinking.  · Drinking when it  is dangerous to drink, such as before driving a car.  · Continuing to drink even though you know you might have a physical or mental problem related to drinking.  · Needing more and more alcohol to get the same effect you want from the alcohol (building up tolerance).  · Having symptoms of withdrawal when you stop drinking. Symptoms of withdrawal include:  ? Fatigue.  ? Nightmares.  ? Trouble sleeping.  ? Depression.  ? Anxiety.  ? Fever.  ? Seizures.  ? Severe confusion.  ? Feeling or seeing things that are not there (hallucinations).  ? Tremors.  ? Rapid heart rate.  ? Rapid breathing.  ? High blood pressure.  · Drinking to avoid symptoms of withdrawal.  How is this diagnosed?  This condition is diagnosed with an assessment. Your health care provider may start the assessment by asking three or four questions about your drinking.  Your health care provider may perform a physical exam or do lab tests to see if you have physical problems resulting from alcohol use. She or he may refer you to a mental health professional for evaluation.  How is this treated?  Some people with alcohol use disorder are able to reduce their alcohol use to low-risk levels. Others need to completely quit drinking alcohol. When necessary, mental health professionals with specialized training in substance use treatment can help. Your health care provider can help you decide how severe your alcohol use disorder is and what type of treatment you need. The following forms of treatment are available:  · Detoxification. Detoxification involves quitting drinking and using prescription medicines within the first week to help lessen withdrawal symptoms. This treatment is important for people who have had withdrawal symptoms before and for heavy drinkers who are likely to have withdrawal symptoms. Alcohol withdrawal can be dangerous, and in severe cases, it can cause death. Detoxification may be provided in a home, community, or primary care setting,  or in a hospital or substance use treatment facility.  · Counseling. This treatment is also called talk therapy. It is provided by substance use treatment counselors. A counselor can address the reasons you use alcohol and suggest ways to keep you from drinking again or to prevent problem drinking. The goals of talk therapy are to:  ? Find healthy activities and ways for you to cope with stress.  ? Identify and avoid the things that trigger your alcohol use.  ? Help you learn how to handle cravings.  · Medicines. Medicines can help treat alcohol use disorder by:  ? Decreasing alcohol cravings.  ? Decreasing the positive feeling you have when you drink alcohol.  ? Causing an uncomfortable physical reaction when you drink alcohol (aversion therapy).  · Support groups. Support groups are led by people who have quit drinking. They provide emotional support, advice, and guidance.  These forms of treatment are often combined. Some people with this condition benefit from a combination of treatments provided by specialized substance use treatment centers.  Follow these instructions at home:  · Take over-the-counter and prescription medicines only as told by your health care provider.  · Check with your health care provider before starting any new medicines.  · Ask friends and family members not to offer you alcohol.  · Avoid situations where alcohol is served, including gatherings where others are drinking alcohol.  · Create a plan for what to do when you are tempted to use alcohol.  · Find hobbies or activities that you enjoy that do not include alcohol.  · Keep all follow-up visits as told by your health care provider. This is important.  How is this prevented?  · If you drink, limit alcohol intake to no more than 1 drink a day for nonpregnant women and 2 drinks a day for men. One drink equals 12 oz of beer, 5 oz of wine, or 1½ oz of hard liquor.  · If you have a mental health condition, get treatment and support.  · Do  not give alcohol to adolescents.  · If you are an adolescent:  ? Do not drink alcohol.  ? Do not be afraid to say no if someone offers you alcohol. Speak up about why you do not want to drink. You can be a positive role model for your friends and set a good example for those around you by not drinking alcohol.  ? If your friends drink, spend time with others who do not drink alcohol. Make new friends who do not use alcohol.  ? Find healthy ways to manage stress and emotions, such as meditation or deep breathing, exercise, spending time in nature, listening to music, or talking with a trusted friend or family member.  Contact a health care provider if:  · You are not able to take your medicines as told.  · Your symptoms get worse.  · You return to drinking alcohol (relapse) and your symptoms get worse.  Get help right away if:  · You have thoughts about hurting yourself or others.  If you ever feel like you may hurt yourself or others, or have thoughts about taking your own life, get help right away. You can go to your nearest emergency department or call:  · Your local emergency services (911 in the [place]  · A suicide crisis helpline, such as the National Suicide Prevention Lifeline at 1-695.893.6307. This is open 24 hours a day.  Summary  · Alcohol use disorder is when your drinking disrupts your daily life. When you have this condition, you drink too much alcohol and you cannot control your drinking.  · Treatment may include detoxification, counseling, medicine, and support groups.  · Ask friends and family members not to offer you alcohol. Avoid situations where alcohol is served.  · Get help right away if you have thoughts about hurting yourself or others.  This information is not intended to replace advice given to you by your health care provider. Make sure you discuss any questions you have with your health care provider.  Document Released: 01/25/2006 Document Revised: 11/30/2018 Document Reviewed:  09/14/2017  Elsevier Patient Education © 2020 Else[employer] Inc.    Depression / Suicide Risk    As you are discharged from this [facility] facility, it is important to learn how to keep safe from harming yourself.    Recognize the warning signs:  · Abrupt changes in personality, positive or negative- including increase in energy   · Giving away possessions  · Change in eating patterns- significant weight changes-  positive or negative  · Change in sleeping patterns- unable to sleep or sleeping all the time   · Unwillingness or inability to communicate  · Depression  · Unusual sadness, discouragement and loneliness  · Talk of wanting to die  · Neglect of personal appearance   · Rebelliousness- reckless behavior  · Withdrawal from people/activities they love  · Confusion- inability to concentrate     If you or a loved one observes any of these behaviors or has concerns about self-harm, here's what you can do:  · Talk about it- your feelings and reasons for harming yourself  · Remove any means that you might use to hurt yourself (examples: pills, rope, extension cords, firearm)  · Get professional help from the community (Mental Health, Substance Abuse, psychological counseling)  · Do not be alone:Call your Safe Contact- someone whom you trust who will be there for you.  · Call your local CRISIS HOTLINE 762-7693 or 722-922-3046  · Call your local Children's Mobile Crisis Response Team [place] (656) 990-4079 or www.[employer]  · Call the toll free National Suicide Prevention Hotlines   · National Suicide Prevention Lifeline 846-945-QKOJ (7387)  · National Hope Line Network 800-SUICIDE (136-7699)

## 2020-10-07 NOTE — PROGRESS NOTES
Patient escorted via wheelchair to car to go home with partner. IV and tele box removed, all belongings gathered and sent home with patient. Patient a&ox4, educated on medications and paper prescriptions provided with medications and OT/PT. Verbalized understanding and denied any questions or additional needs at this time. FWW delivered at bedside.

## 2020-10-07 NOTE — THERAPY
Physical Therapy   Daily Treatment     Patient Name: Frank Yee  Age:  35 y.o., Sex:  male  Medical Record #: [id number]  Today's Date: 10/6/2020     Precautions: Fall Risk    Assessment    Pt with improved balance with use of FWW for gait requiring SPV. He tolerated 150ft x2 with FWW to stairs and back. Pt cued to ascend/descend laterally to improve stability as pt only has UHR hold at home. Pt conts to demonstrate LE fatigue with amb and demonstrates decreased ankle DF for adequate foot clearance despite VC.     Plan    Continue current treatment plan.    DC Equipment Recommendations: Front-Wheel Walker  Discharge Recommendations: Recommend outpatient physical therapy services to address higher level deficits     Objective     10/06/20 1700   Balance   Sitting Balance (Static) Good   Sitting Balance (Dynamic) Good   Standing Balance (Static) Fair   Standing Balance (Dynamic) Fair   Gait Analysis   Gait Level Of Assist Supervised   Assistive Device Front Wheel Walker   Distance (Feet) 150   # of Times Distance was Traveled 2   Deviation Bradykinetic;Decreased Base Of Support   # of Stairs Climbed 3   Level of Assist with Stairs Supervised   Weight Bearing Status FWB   Comments Improved balance with FWW, educated on performing stair negotiation laterally for improved stability   Bed Mobility    Supine to Sit Supervised   Sit to Supine Supervised   Functional Mobility   Bed, Chair, Wheelchair Transfer Supervised   Short Term Goals    Short Term Goal # 1 Pt will perform all fxnl transfers with LRAD and SPV within 6 visit to negotiate transfers at home safely.   Goal Outcome # 1 Goal met   Short Term Goal # 2 Pt will amb x300ft with LRAD and SPV within 6 visits to negotiate community distances safely at [place].   Goal Outcome # 2 Progressing as expected   Short Term Goal # 3 Pt will ascend/descend 4 steps with UHR Hold and SPV within 6 visits to enter/exit home safely at [place].   Goal Outcome # 3 Progressing as expected

## 2020-10-10 NOTE — PROGRESS NOTES
For billing purposes only:        . Date of service for H&P is date of my attestation; date of service for a progress note is date signed by the resident; date of service for discharge summery is day of discharge.  Please apply this to billing for my prior notes.

## 2020-10-19 PROBLEM — K72.10 END STAGE LIVER DISEASE (HCC): Status: ACTIVE | Noted: 2020-01-01

## 2020-10-19 PROBLEM — R57.8 HEMORRHAGIC SHOCK (HCC): Status: ACTIVE | Noted: 2020-01-01

## 2020-10-19 PROBLEM — J96.00 ACUTE RESPIRATORY FAILURE (HCC): Status: ACTIVE | Noted: 2020-01-01

## 2020-10-19 PROBLEM — E87.5 HYPERKALEMIA: Status: ACTIVE | Noted: 2020-01-01

## 2020-10-19 NOTE — PROGRESS NOTES
Day intensivist note    Reason for admission was reviewed.  Pt was seen by Dr. Tinajero earlier at ED for profuse upper GI bleed, hemorrhagic shock, decompensated alcoholic liver cirrhosis, NANCY, hepatorenal syndrome, profound metabolic acidosis. Pt was intubated, Blakemore tube was inserted, on multiple pressors, had 6:6:1 transfusions.     Upon ICU arrival, pt is intubated, on mechanical ventilation 50%/8, 2 pressors NE at 35 and vasopressin, protonix, octreotide and Blakemore tube was in place.   GI came for emergent EGD  Meanwhile, multiple blood product transfusions given, factor VII, active titration of pressors, bicarb pushes.     Assessment:  Profound upper GI bleed from esophageal/EG junction varices.   Profound hemorrhagic shock  Decompensated alcoholic cirrhosis.   Profound metabolic acidosis  Profound coagulopathy for cirrhosis  Severe lactic acidosis.   Portal HTN, ascites  NANCY  Hepatorenal syndrome    Profoundly critically ill patient with multiple organ failure and very poor prognosis.     Plan:   Neuro:    - Sedation with dexmedetomidine and fentanyl gtt to keep RASS -4 to -5    Cardiac:   - Continue NE, vasopressin, and phenylephrine to keep SBP >90  - Lactates high in 7-8s    Respiratory  - Very poor compliance due to ARDS, aspiration pneumonitis/severe consolidation from bleeding  - Continue full vent support keep sat >88%  - we were having trouble of getting approprite volumes during EGD and pt had high peak pressures with hgih Pplat  - vent mode was changed to pressure control with driving pressure 21 (Pi 33, PEEP 12)   - bedside US with +sliding signs bilaterally.    - CXR with extensive consolidation in right lungs. No ptx  - s/p bronchoscopy with large amount of thin blood-tinged secretions, all of which was suctioned. No obstruction, no blood clot.     GI/Hepatology   - s/p emergent EGD with 5-6 bandings for esophageal varices at EJ junction - still have some oozing of bleeding, not able to  achieve complete hemostatis.   - GI recommend not to reinsert Blakemore tube due to existing banding  - I did discuss with IR, Dr. Pro again regarding the indication for TIPS. Given pt MELD 40 and the risk outweigh benefit and would be futile care. IR does not recommend TIPS at this time  - Continue protonix gtt, octreotide gtt  - Vitamin K 10mg IV daily x 3 days  - GI following    Hematology/Hepatology  - Correct coagulopathy as best as we can.   - multiple PRBCs, FFPs and platelet   - s/p calcium chloride 1gr x2   - s/p fVII x1  - Blood product transfusions: 10 PRBCs:10 FFPs: 2platelets  - Transfuse 2units of cryo  - CBC Q4H, fibrinogen Q4H, repeat INR   - Transfusion parameters: keep Hgb >7, plt >50k, fibrinogen >200, INR <2  - Avoid cold temperature and metabolic acidosis    Renal  - Profound metabolic acidosis - likely both metabolic and respiratory acidosis with mainly driven by metabolic  - Multiple bicarb pushes were given and on bicarb gtt at 150  - Monitor K and Mg closely.   - Monitor UOP closely. Currently anuric. Given his poor prognosis, I don't believe dialysis would offer much.   - Check urine sodium   - Nephrology consulted (Dr. Lester)    Endocrine.   - Pt was profoundly hypoglycemic at ICU arrival, was in 20s, s/p 2amps of D50  - Keep -180  - Blood glucose check Q1H     Goal of care:   - I had long discussions with boyfriend regarding patient's critical condition. All questions were answered.   - I later called patient's family. Pt has 3 siblings (2 brothers in [place] and 1 sister in [place]). I spoke to both Sister Kerri (whos in [place]) and Jose (who's eldest sibiling). Both brothers plan to come here tomorrow. After talking to Jose, he evidently does not know about patient's boyfriend at all and asked us not to give any information to him. I also discussed about pt's very poor prognosis and high risk of death given his critical condition. He voiced understanding.  - I discussed about code status  and Jose decided for DNR.   - Palliative care has been consulted      The patient remains critically ill. Critical care time = 255 mins in directly providing and coordinating critical care and extensive data review. I was at bedside literally throughout the afternoon actively managing his critical condition from managing his hemorrahgic shock, correcting his coagulopathy, vent management, discussion with GI, before after EGD, talked to IR, talked to family. No time overlap and excludes procedures.     Roderick Young D.O.

## 2020-10-19 NOTE — ED TRIAGE NOTES
Pt is a flight transfer from [facility]. Pt is intubated on arrival. Pt presented to [facility] with the CC of vomited x3 weeks. Pt was hypotensive at outlying facility.

## 2020-10-19 NOTE — PROGRESS NOTES
Procedure Requested: TIPS  Date of request: 10/19/2020  Date of consultation: 10/19/2020    Requesting Provider: Dr. Tinajero, intensivist.    Summary:  35 y.o. male who presented 10/19/2020 with history of end-stage alcoholic liver disease.  He presented to outside hospital complaining of vomiting for 3 weeks and he has now vomiting bright red blood.  He was intubated for airway protection.  He received 2 units of packed red blood cells and 1 unit of FFP resuscitation.  A Levophed infusion was initiated.  He was transferred to [facility] for further evaluation and management.  When he arrived he had copious bright red blood pouring out of his mouth and nose, and was requiring high-dose Levophed to maintain his blood pressure.  Massive transfusion was initiated.    Imaging Findings:  Findings consistent with acute hepatitis superimposed on end-stage liver disease. The portal vein is patent. Esophageal varices with probable active extravasation into the dependent portions of the stomach.    Interventional Radiology Recommendation:  Patient's MELD score corrected for sodium is approximately 40 which represents an unacceptable mortality risk following TIPS placement from worsening liver failure.     Recommendation at this point would be continued use of the S[place]st[place]-Blakemore tube to achieve hemostasis, correction of coagulopathy and acute liver failure with potential for reevaluation for TIPS procedure when the patient is stable.    Derrick Pro MD  Interventional Radiology

## 2020-10-19 NOTE — DISCHARGE PLANNING
Medical Social Work    Referral: Critical Patient Transfer    Intervention: Pt is a 35 year old male brought in by [facility] from [facility].  Pt arrives intubated and hypotensive.  Pt is Frank Yee (: 1985).  Per chart pt's emergency contact is his significant other, Jaime Dorsey ([phone]).  MSW contacted pt's significant other who states that he will not be able to come see pt until later this afternoon when his mom can drive from [facility] to [place] to pick him up.  Pt's significant other was very tearful and extensive emotional support was provided.  Pt's significant other states that he and pt are not  and pt has no advanced directives or POA paperwork.  Pt's significant other states that pt's parents are  but that pt has a sister in [place].  Pt's significant other has attempted to text and call pt's sister with no answer at this time.  He understands that pt's sister is his next of kin and he will continue to try to get a hold of her.      Plan: SW will follow as needed.

## 2020-10-19 NOTE — ASSESSMENT & PLAN NOTE
Status post massive upper GI bleed due to esophageal and gastric varices  TEG guided transfusion strategies  Continue Levophed, vasopressin, octreotide infusions

## 2020-10-19 NOTE — ED PROVIDER NOTES
"CHIEF COMPLAINT  Chief Complaint   Patient presents with   • Upper GI Bleed       HPI  Frank Yee is a 35 y.o. male who presents to the emergency department by air ambulance from outside hospital, [facility], for upper GI bleed.  Patient was seen at the outside facility after presenting for vomiting, hematemesis.  History of EtOH abuse, no known history for more severe disease at this time.  Patient was reportedly jaundiced, icteric with uncontrolled upper GI hemorrhage.  He was intubated at the outside facility, maxed on Levophed and given 1 unit of PRBCs and 1 pack of FFP prior to transfer.    HPI is otherwise limited due to patient's altered mental status.    Per outside records, patient with hemoglobin 10-7 on repeat evaluation prior to transfer.  Creatinine normal to 5.  Lactate 5.5.  Bilirubin 22.    REVIEW OF SYSTEMS  See HPI for further details. All other systems are negative.     PAST MEDICAL HISTORY   Unknown, denies    SOCIAL HISTORY  Social History     Tobacco Use   • Smoking status: Never Smoker   • Smokeless tobacco: Never Used   Substance and Sexual Activity   • Alcohol use: Not Currently   • Drug use: Not Currently   • Sexual activity: Not on file   EtOH dependence unknown otherwise    SURGICAL HISTORY  patient denies any surgical history    CURRENT MEDICATIONS  Home Medications     Reviewed by Jana Maddox (Pharmacy Tech) on 10/19/20 at 0718  Med List Status: Complete   Medication Last Dose Status   folic acid (FOLVITE) 1 MG Tab 10/18/2020 Active   furosemide (LASIX) 20 MG Tab 10/16/2020 Active   multivitamin (THERAGRAN) Tab 10/18/2020 Active   thiamine (THIAMINE) 100 MG tablet 10/18/2020 Active                ALLERGIES  No Known Allergies    PHYSICAL EXAM  VITAL SIGNS: /57   Pulse 69   Temp (!) 29.6 °C (85.2 °F) (Temporal)   Resp 20   Ht 1.854 m (6' 1\")   Wt 105 kg (231 lb 7.7 oz)   SpO2 99%   BMI 30.54 kg/m²   Pulse ox interpretation: I interpret this " pulse ox as normal while intubated and mechanically ventilated  Constitutional: Intubated, sedated  HENT: Normocephalic, atraumatic. Bilateral external ears normal, Nose normal.   Eyes: Pupils are equal and reactive, Conjunctiva normal.  Grossly icteric  Neck: Normal range of motion.   Lymphatic: No lymphadenopathy noted.   Cardiovascular: Regular rate and rhythm, no murmurs. Distal pulses intact.  3+ pitting bilateral lower extremity edema.  Thorax & Lungs: Diminished but audible and otherwise clear bilaterally with mechanical ventilation.   Abdomen: Obese, soft, distended.  No palpable or pulsatile mass.  Skin: Warm, Dry, No erythema, No rash.   Musculoskeletal: No major deformities noted.   Neurologic: Intubated, sedated  Psychiatric: Intubated, sedated      DIAGNOSTIC STUDIES / PROCEDURES  LABS  Results for orders placed or performed during the hospital encounter of 10/19/20   CBC WITH DIFFERENTIAL   Result Value Ref Range    WBC 17.6 (H) 4.8 - 10.8 K/uL    RBC 1.73 (L) 4.70 - 6.10 M/uL    Hemoglobin 5.4 (LL) 14.0 - 18.0 g/dL    Hematocrit 16.1 (LL) 42.0 - 52.0 %    MCV 95.4 81.4 - 97.8 fL    MCH 31.2 27.0 - 33.0 pg    MCHC 32.7 (L) 33.7 - 35.3 g/dL    RDW 65.1 (H) 35.9 - 50.0 fL    Platelet Count 222 164 - 446 K/uL    MPV 9.8 9.0 - 12.9 fL    Neutrophils-Polys 62.40 44.00 - 72.00 %    Lymphocytes 11.10 (L) 22.00 - 41.00 %    Monocytes 11.10 0.00 - 13.40 %    Eosinophils 1.70 0.00 - 6.90 %    Basophils 0.90 0.00 - 1.80 %    Nucleated RBC 0.30 /100 WBC    Neutrophils (Absolute) 12.04 (H) 1.82 - 7.42 K/uL    Lymphs (Absolute) 1.95 1.00 - 4.80 K/uL    Monos (Absolute) 1.95 (H) 0.00 - 0.85 K/uL    Eos (Absolute) 0.30 0.00 - 0.51 K/uL    Baso (Absolute) 0.16 (H) 0.00 - 0.12 K/uL    NRBC (Absolute) 0.05 K/uL    Hypochromia 1+     Anisocytosis 1+     Macrocytosis 1+     Microcytosis 1+    COMP METABOLIC PANEL   Result Value Ref Range    Sodium 120 (L) 135 - 145 mmol/L    Potassium 5.6 (H) 3.6 - 5.5 mmol/L    Chloride  95 (L) 96 - 112 mmol/L    Co2 11 (L) 20 - 33 mmol/L    Anion Gap 14.0 7.0 - 16.0    Glucose 237 (H) 65 - 99 mg/dL    Bun 90 (HH) 8 - 22 mg/dL    Creatinine 4.51 (H) 0.50 - 1.40 mg/dL    Calcium 6.3 (LL) 8.5 - 10.5 mg/dL    AST(SGOT) 303 (H) 12 - 45 U/L    ALT(SGPT) 79 (H) 2 - 50 U/L    Alkaline Phosphatase 105 (H) 30 - 99 U/L    Total Bilirubin 17.0 (H) 0.1 - 1.5 mg/dL    Albumin 1.3 (L) 3.2 - 4.9 g/dL    Total Protein 3.6 (L) 6.0 - 8.2 g/dL    Globulin see below 1.9 - 3.5 g/dL    A-G Ratio see below g/dL   LIPASE   Result Value Ref Range    Lipase 577 (H) 11 - 82 U/L   PROTHROMBIN TIME (INR)   Result Value Ref Range    PT 31.4 (H) 12.0 - 14.6 sec    INR 2.92 (H) 0.87 - 1.13   APTT   Result Value Ref Range    APTT 94.4 (H) 24.7 - 36.0 sec   COD (ADULT)   Result Value Ref Range    ABO Grouping Only A     Rh Grouping Only POS     Antibody Screen-Cod NEG    LACTIC ACID   Result Value Ref Range    Lactic Acid 5.1 (HH) 0.5 - 2.0 mmol/L   MASSIVE TRANSFUSION   Result Value Ref Range    Component R       R99                 Red Cells, LR       [id number]   issued       10/19/20   05:57      Product Type R99     Dispense Status issued     Unit Number (Barcoded) [id number]     Product Code (Barcoded) A8658R73     Blood Type (Barcoded) 5100     Component R       R99                 Red Cells, LR       [id number]   issued       10/19/20   05:57      Product Type R99     Dispense Status issued     Unit Number (Barcoded) [id number]     Product Code (Barcoded) X2196G95     Blood Type (Barcoded) 5100     Component R       R99                 Red Cells, LR       [id number]   issued       10/19/20   05:57      Product Type R99     Dispense Status issued     Unit Number (Barcoded) [id number]     Product Code (Barcoded) A5045L12     Blood Type (Barcoded) 5100     Component R       R99                 Red Cells, LR       [id number]   issued       10/19/20   05:57      Product Type R99     Dispense Status issued      Unit Number (Barcoded) [id number]     Product Code (Barcoded) V3154S18     Blood Type (Barcoded) 5100     Component F       TA2                 Thawed Plasma 2     [id number]   issued       10/19/20   05:57      Product Type Thawed Apheresis Plasma 2nd Cont     Dispense Status issued     Unit Number (Barcoded) [id number]     Product Code (Barcoded) X3839D79     Blood Type (Barcoded) 8400     Component F       TA4                 Thawed Plasma 4     [id number]   issued       10/19/20   05:57      Product Type Thawed Apheresis Plasma 4th Cont     Dispense Status issued     Unit Number (Barcoded) [id number]     Product Code (Barcoded) S2284N13     Blood Type (Barcoded) 8400     Component F       TA3                 Thawed Plasma 3     [id number]   issued       10/19/20   05:57      Product Type Thawed Apheresis Plasma 3rd Cont     Dispense Status issued     Unit Number (Barcoded) [id number]     Product Code (Barcoded) U7246F21     Blood Type (Barcoded) 8400     Component F       TA1                 Thawed Plasma 1     [id number]   issued       10/19/20   05:57      Product Type Thawed Apheresis Plasma 1st Cont     Dispense Status issued     Unit Number (Barcoded) [id number]     Product Code (Barcoded) M8265F36     Blood Type (Barcoded) 8400     Component P       P1                  Plt Pheresis        [id number]   issued       10/19/20   05:57      Product Type P1     Dispense Status issued     Unit Number (Barcoded) [id number]     Product Code (Barcoded) I9382J93     Blood Type (Barcoded) 6200    COVID/SARS CoV-2 PCR    Specimen: Nasopharyngeal; Respirate   Result Value Ref Range    COVID Order Status Received    SARS-COV Antigen SAMANTHA   Result Value Ref Range    SARS-CoV-2 Source Nasal Swab     SARS-COV ANTIGEN SAMANTHA NotDetected Not-Detected   ESTIMATED GFR   Result Value Ref Range    GFR If  18 (A) >60 mL/min/1.73 m 2    GFR If Non African American 15 (A) >60 mL/min/1.73  m 2   DIFFERENTIAL MANUAL   Result Value Ref Range    Bands-Stabs 6.00 0.00 - 10.00 %    Metamyelocytes 0.90 %    Myelocytes 6.00 %    Manual Diff Status PERFORMED    PERIPHERAL SMEAR REVIEW   Result Value Ref Range    Peripheral Smear Review see below    PLATELET ESTIMATE   Result Value Ref Range    Plt Estimation Normal    MORPHOLOGY   Result Value Ref Range    RBC Morphology Present     Polychromia 1+     Poikilocytosis 1+     Schistocytes 1+     Echinocytes 1+    CBC without Differential   Result Value Ref Range    WBC 11.1 (H) 4.8 - 10.8 K/uL    RBC 1.79 (L) 4.70 - 6.10 M/uL    Hemoglobin 5.6 (LL) 14.0 - 18.0 g/dL    Hematocrit 18.3 (L) 42.0 - 52.0 %    .2 (H) 81.4 - 97.8 fL    MCH 31.3 27.0 - 33.0 pg    MCHC 30.6 (L) 33.7 - 35.3 g/dL    RDW 61.0 (H) 35.9 - 50.0 fL    Platelet Count 143 (L) 164 - 446 K/uL    MPV 9.8 9.0 - 12.9 fL   Lactic acid   Result Value Ref Range    Lactic Acid 5.0 (HH) 0.5 - 2.0 mmol/L   Comp Metabolic Panel   Result Value Ref Range    Sodium 126 (L) 135 - 145 mmol/L    Potassium 5.3 3.6 - 5.5 mmol/L    Chloride 104 96 - 112 mmol/L    Co2 7 (LL) 20 - 33 mmol/L    Anion Gap 15.0 7.0 - 16.0    Glucose 79 65 - 99 mg/dL    Bun 78 (HH) 8 - 22 mg/dL    Creatinine 3.77 (H) 0.50 - 1.40 mg/dL    Calcium 6.0 (LL) 8.5 - 10.5 mg/dL    AST(SGOT) 469 (H) 12 - 45 U/L    ALT(SGPT) 118 (H) 2 - 50 U/L    Alkaline Phosphatase 83 30 - 99 U/L    Total Bilirubin 12.8 (H) 0.1 - 1.5 mg/dL    Albumin 1.0 (L) 3.2 - 4.9 g/dL    Total Protein 3.0 (L) 6.0 - 8.2 g/dL    Globulin see below 1.9 - 3.5 g/dL    A-G Ratio see below g/dL   PT/INR   Result Value Ref Range    PT 32.0 (H) 12.0 - 14.6 sec    INR 2.99 (H) 0.87 - 1.13   MASSIVE TRANSFUSION   Result Value Ref Range    Component R       R99                 Red Cells, LR       [id number]   issued       10/19/20   09:24      Product Type R99     Dispense Status issued     Unit Number (Barcoded) [id number]     Product Code (Barcoded) Z2964K15     Blood  Type (Barcoded) 5100     Component R       R99                 Red Cells, LR       [id number]   issued       10/19/20   09:24      Product Type R99     Dispense Status issued     Unit Number (Barcoded) [id number]     Product Code (Barcoded) F1925J43     Blood Type (Barcoded) 5100     Component R       R99                 Red Cells, LR       [id number]   issued       10/19/20   09:24      Product Type R99     Dispense Status issued     Unit Number (Barcoded) [id number]     Product Code (Barcoded) A7081B87     Blood Type (Barcoded) 5100     Component R       R99                 Red Cells, LR       [id number]   issued       10/19/20   09:24      Product Type R99     Dispense Status issued     Unit Number (Barcoded) [id number]     Product Code (Barcoded) A7234Z85     Blood Type (Barcoded) 5100     Component F       TA2                 Thawed Plasma 2     [id number]   issued       10/19/20   09:24      Product Type Thawed Apheresis Plasma 2nd Cont     Dispense Status issued     Unit Number (Barcoded) [id number]     Product Code (Barcoded) F6319V38     Blood Type (Barcoded) 8400     Component F       TA1                 Thawed Plasma 1     [id number]   issued       10/19/20   09:24      Product Type Thawed Apheresis Plasma 1st Cont     Dispense Status issued     Unit Number (Barcoded) [id number]     Product Code (Barcoded) L3822W49     Blood Type (Barcoded) 8400     Component F       TA4                 Thawed Plasma 4     [id number]   issued       10/19/20   09:24      Product Type Thawed Apheresis Plasma 4th Cont     Dispense Status issued     Unit Number (Barcoded) [id number]     Product Code (Barcoded) W1460C93     Blood Type (Barcoded) 8400     Component F       TA                  Thawed Plasma       [id number]   issued       10/19/20   09:24      Product Type Thawed Apheresis Plasma     Dispense Status issued     Unit Number (Barcoded) [id number]     Product Code (Barcoded)  I6017S34     Blood Type (Barcoded) 8400     Component P       P0                  Plts,Pheresis       [id number]   issued       10/19/20   09:24      Product Type Platelets  Pheresis LR     Dispense Status issued     Unit Number (Barcoded) [id number]     Product Code (Barcoded) Q4713Z61     Blood Type (Barcoded) 6200    ABO Rh Confirm   Result Value Ref Range    ABO Rh Confirm A POS    ESTIMATED GFR   Result Value Ref Range    GFR If  22 (A) >60 mL/min/1.73 m 2    GFR If Non  18 (A) >60 mL/min/1.73 m 2   ISTAT ARTERIAL BLOOD GAS   Result Value Ref Range    Ph 7.097 (LL) 7.400 - 7.500    Pco2 27.1 26.0 - 37.0 mmHg    Po2 82 64 - 87 mmHg    Tco2 <10 (L) 20 - 33 mmol/L    S02 91 (L) 93 - 99 %    Hco3 8.4 (L) 17.0 - 25.0 mmol/L    BE -20 (L) -4 - 3 mmol/L    Body Temp 84.2 F degrees    O2 Therapy 60 %    iPF Ratio 137     Ph Temp Yessica 7.199 (LL) 7.400 - 7.500    Pco2 Temp Co 19.1 (L) 26.0 - 37.0 mmHg    Po2 Temp Cor 49 (LL) 64 - 87 mmHg    Specimen Arterial     Action Range Triggered YES     Inst. Qualified Patient YES      RADIOLOGY  DX-CHEST-LIMITED (1 VIEW)   Final Result      1.  Right central catheter is not well seen, tip likely at the confluence of the brachiocephalic veins.   2.  No pneumothorax.   3.  Hypoinflation. Right basilar atelectasis versus consolidations.      OUTSIDE IMAGES-CT ABDOMEN /PELVIS   Final Result      OUTSIDE IMAGES-DX CHEST   Final Result      CT-ABDOMEN-PELVIS WITH   Final Result         1.  Distended small bowel loops with reactive mucosal pattern, appearance suggests ileus and/or enteritis, may be secondary to changes of portal hypertension.   2.  Dependent consolidations of the bilateral lung bases, appearance suggests atelectasis, infiltrates not excluded   3.  Patchy right upper lobe opacities concerning for infiltrates   4.  Hepatomegaly with irregular hepatic contour, appearance favors changes of cirrhosis.   5.  Moderate large quantity of  abdominal ascites   6.  Diffuse subcutaneous fat stranding suggests component of anasarca.      DX-CHEST-PORTABLE (1 VIEW)   Final Result         1.  Hazy bilateral pulmonary infiltrates   2.  Diminished lung volumes   3.  Cardiomegaly          COURSE & MEDICAL DECISION MAKING  Patient was seen evaluated immediately upon arrival from outside hospital in room 1.  Intubated, sedated, max Levophed.    Patient continued to have active bleeding from his oropharynx and nares.  NG tube placed on arrival.    0555 -Dr. Tinajero, intensivist, is aware of the patient agreeable to bedside consultation.  Agrees with mass transfusion.    0620 -Dr. Tinajero has seen and evaluated patient as well.  Plan [place] tube placement.  Dr. Pro, IR is aware, if [place] tube can be placed, bleeding controlled, then patient should have CT venogram for possible IR, TIPS, embolization planning.    Discussion with GI, , who is aware the patient agreeable to consultation, although given ongoing hemorrhage no indication for scope at this time.  Will await CT results and IR input.    [place] tube placed alongside Dr. Tinajero.  Patient is intubated, sedated during this event tolerated procedure well otherwise.  He is maxed on Levophed, receiving PRBCs and FFP via Jennifer.    Chest x-ray confirms gastric placement of [place].    Plan for ICU admission.  Awaiting CT venogram.    0812 -CT venogram complete.  I have talked to Dr. Pro, although anatomically appropriate, given high meld score and liver failure, he does not believe patient is a candidate for intervention, TIPS nor embolism at this time.  He believes efforts at this point would be futile.  Patient at this time has also returned from CT, sudden 900 cc output when [place] tube replaced to suction.  Continues to bleed from mouth and nares as well.  Add additional PRBCs and FFP.  Repeat labs.    0828 -I have now inflated the esophageal portion of the [place] tube to 50 cc.   Gastric balloon stays at 400 from previous.  Inconsistent hemostasis/tamponade.  Dr. Cr now bedside as well.  Will assume care.    The total critical care time on this patient is 40 minutes, immediate and continuous hemodynamic monitoring and multiple bedside evaluations, resuscitating patient and assessing response to treatment, deciphering test results, speaking with admitting and consulting physician, and arranging for ICU hospital admission. This 40 minutes is exclusive of separately billable procedures.      FINAL IMPRESSION  (K92.2) Upper GI bleed  (R57.8) Hemorrhagic shock (HCC)  (K76.7) Hepatorenal failure (HCC)  (D62) Acute blood loss anemia      Electronically signed by: Lilo Calderón D.O., 10/19/2020 12:46 PM      This dictation was created using voice recognition software. The accuracy of the dictation is limited to the abilities of the software. I expect there may be some errors of grammar and possibly content. The nursing notes were reviewed and certain aspects of this information were incorporated into this note.

## 2020-10-19 NOTE — CONSULTS
Gastroenterology Consult Note     Date of Consult: 10/19/20   Referring Physician: Jaime Tinajero M.D.      Reason for consult: Upper GI Bleed        HPI: 34yo male with history of cirrhosis, heavy ETOH use transferred from [place] due to massive upper GI bleeding, arrived intubated, sedated with continuous bleeding from mouth and nose, needing pressors and transfusion.     Was admitted here in beginning of October 2020 with NANCY, alcoholic cirrhosis and ETOH withdrawal, and indicated he was not ready to quit drinking.     PMHX:No past medical history on file.       PSurgHx: No past surgical history on file.     ALLERGIES:Patient has no known allergies.     SocHx:   Per last admission drinks 6 pints of 99% ETOH daily     FAMHx: No family history on file.     ROS:  Unable to obtain:intubated     PE:  Vitals:    10/19/20 0715 10/19/20 0730 10/19/20 0803 10/19/20 0815   BP: 103/56 (!) 95/51 110/51 (!) 96/46   Pulse: 67 60 70 68   Resp: (!) 23 (!) 21 (!) 23 (!) 23   SpO2: 99% 98% 97% 97%   Weight:       Height:         Gen: Intubated, generalized icterus  HEENT: blakemore tube in place, suctioning blood  Neck: supple, no cervical or supraclavicular adenopathy  CVS: regular rhythm, normal rate, no MRG  Pulm: coarse rhonchi bilaterally  Abd: protuberant, soft, +BS  Ext: 2+ edema bilaterally  NEURO: unable to assess  Skin: generalized jaundice  Psych: unable to asess     LABS:  Lab Results   Component Value Date/Time    SODIUM 120 (L) 10/19/2020 05:50 AM    POTASSIUM 5.6 (H) 10/19/2020 05:50 AM    CHLORIDE 95 (L) 10/19/2020 05:50 AM    CO2 11 (L) 10/19/2020 05:50 AM    GLUCOSE 237 (H) 10/19/2020 05:50 AM    BUN 90 (HH) 10/19/2020 05:50 AM    CREATININE 4.51 (H) 10/19/2020 05:50 AM      Lab Results   Component Value Date/Time    WBC 17.6 (H) 10/19/2020 05:50 AM    RBC 1.73 (L) 10/19/2020 05:50 AM    HEMOGLOBIN 5.4 (LL) 10/19/2020 05:50 AM    HEMATOCRIT 16.1 (LL) 10/19/2020  05:50 AM    MCV 95.4 10/19/2020 05:50 AM    MCH 31.2 10/19/2020 05:50 AM    MCHC 32.7 (L) 10/19/2020 05:50 AM    MPV 9.8 10/19/2020 05:50 AM    NEUTSPOLYS 62.40 10/19/2020 05:50 AM    LYMPHOCYTES 11.10 (L) 10/19/2020 05:50 AM    MONOCYTES 11.10 10/19/2020 05:50 AM    EOSINOPHILS 1.70 10/19/2020 05:50 AM    BASOPHILS 0.90 10/19/2020 05:50 AM    HYPOCHROMIA 1+ 10/19/2020 05:50 AM    ANISOCYTOSIS 1+ 10/19/2020 05:50 AM        Lab Results   Component Value Date/Time    PROTHROMBTM 31.4 (H) 10/19/2020 05:50 AM    INR 2.92 (H) 10/19/2020 05:50 AM      Recent Labs     10/19/20  0550   ASTSGOT 303*   ALTSGPT 79*   TBILIRUBIN 17.0*   GLOBULIN see below   INR 2.92*          ASSESSMENT: Massive upper GI bleed likely secondary to variceal bleed in setting of acute decompensated liver failure. Also has renal failure (?HRS), Hyponatremia. Elevated lipase as well.   MELD-Na is 41. Very poor prognosis       PLAN:   1. Continue blakemore tube with tension for tamponade, IV PPI and octreotide drip  2. Plan for EGD this afternoon   3. Continue transfusions and  FFP to reverse INR      Thank you for this consult.

## 2020-10-19 NOTE — CONSULTS
Critical Care Consultation    Date of consult: 10/19/2020    Referring Physician  Jaime Tinajero M.D.    Reason for Consultation  I was called stat to the bedside in the emergency department to assist in the resuscitation for GI bleeding.    History of Presenting Illness  35 y.o. male who presented 10/19/2020 with history of end-stage alcoholic liver disease.  He presented to outside hospital complaining of vomiting for 3 weeks and he has now vomiting bright red blood.  He was intubated for airway protection.  He received 2 units of packed red blood cells and 1 unit of FFP resuscitation.  A Levophed infusion was initiated.  He was transferred to [facility] for further evaluation and management.  When he arrived he had copious bright red blood pouring out of his mouth and nose, and was requiring high-dose Levophed to maintain his blood pressure.  Massive transfusion was initiated.  Dr. Calderón and myself emergently placed a [place] tube.  Interventional radiology was consulted for possible TIPS.      At the time of this dictation patient has received 3 units of packed red blood cells and 3 units of FFP while in the [facility] emergency department.    Code Status  Prior    Review of Systems  Review of Systems   Unable to perform ROS: Intubated       Past Medical History   has no past medical history on file.    Surgical History   has no past surgical history on file.    Family History  family history is not on file.    Social History   reports that he has never smoked. He has never used smokeless tobacco. He reports previous alcohol use. He reports previous drug use.    Medications  Home Medications    **Home medications have not yet been reviewed for this encounter**       Current Facility-Administered Medications   Medication Dose Route Frequency Provider Last Rate Last Dose   • norepinephrine (Levophed) infusion 8 mg/250 mL (premix)  0-30 mcg/min Intravenous Continuous  Lilo Calderón D.O. 56.3 mL/hr at 10/19/20 0600 30 mcg/min at 10/19/20 0600   • dexmedetomidine (PRECEDEX) 400 mcg/100mL NS premix infusion  0.1-1.5 mcg/kg/hr Intravenous Continuous Lilo Calderón D.O. 39.4 mL/hr at 10/19/20 0545 1.5 mcg/kg/hr at 10/19/20 0545   • DEXTROSE 50 % IV SOLN            • octreotide (SANDOSTATIN) 1,250 mcg in  mL Infusion  50 mcg/hr Intravenous Continuous Lilo Calderón D.O.       • dextrose 50% (D50W) injection 50 mL  50 mL Intravenous Once Lilo Calderón D.O.       • CEFTRIAXONE SODIUM 1 G INJ SOLR            • OMEPRAZOLE 20 MG PO CPDR            • SODIUM CHLORIDE 0.9 % IV SOLN            • cefTRIAXone (ROCEPHIN) 1 g in  mL IVPB  1 g Intravenous Once Lilo Calderón D.O.         Current Outpatient Medications   Medication Sig Dispense Refill   • furosemide (LASIX) 20 MG Tab Take 20 mg by mouth every Monday, Wednesday, and Friday.     • thiamine (THIAMINE) 100 MG tablet Take 1 Tab by mouth every day. 30 Tab 0   • folic acid (FOLVITE) 1 MG Tab Take 1 Tab by mouth every day. 30 Tab 0   • multivitamin (THERAGRAN) Tab Take 1 Tab by mouth every day. 30 Tab 0       Allergies  No Known Allergies    Vital Signs last 24 hours  Pulse:  [75] 75  Resp:  [18] 18  BP: (112)/(55) 112/55  SpO2:  [97 %] 97 %    Physical Exam  Physical Exam  Constitutional:       Appearance: He is ill-appearing.   HENT:      Head:      Comments: Bright red blood pouring from both nares and mouth.     Mouth/Throat:      Comments: Bloodstained  Eyes:      General: Scleral icterus present.      Pupils: Pupils are equal, round, and reactive to light.   Neck:      Musculoskeletal: Neck supple.   Cardiovascular:      Rate and Rhythm: Normal rate.      Heart sounds: No murmur. No friction rub. No gallop.    Pulmonary:      Breath sounds: Rhonchi present.      Comments: Diminished bilaterally  Abdominal:      General: There is no distension.      Palpations: There is no mass.      Tenderness: There is no  abdominal tenderness.      Comments: Round soft   Musculoskeletal:      Right lower leg: Edema present.      Left lower leg: Edema present.      Comments: 3+ pitting edema   Skin:     Coloration: Skin is jaundiced.      Comments: Profound jaundice   Neurological:      Comments: Deeply sedated and chemically paralyzed   Psychiatric:      Comments: Unable to assess         Fluids  No intake or output data in the 24 hours ending 10/19/20 0715    Laboratory  Recent Results (from the past 48 hour(s))   CBC WITH DIFFERENTIAL    Collection Time: 10/19/20  5:50 AM   Result Value Ref Range    WBC 17.6 (H) 4.8 - 10.8 K/uL    RBC 1.73 (L) 4.70 - 6.10 M/uL    Hemoglobin 5.4 (LL) 14.0 - 18.0 g/dL    Hematocrit 16.1 (LL) 42.0 - 52.0 %    MCV 95.4 81.4 - 97.8 fL    MCH 31.2 27.0 - 33.0 pg    MCHC 32.7 (L) 33.7 - 35.3 g/dL    RDW 65.1 (H) 35.9 - 50.0 fL    Platelet Count 222 164 - 446 K/uL    MPV 9.8 9.0 - 12.9 fL    Neutrophils-Polys 62.40 44.00 - 72.00 %    Lymphocytes 11.10 (L) 22.00 - 41.00 %    Monocytes 11.10 0.00 - 13.40 %    Eosinophils 1.70 0.00 - 6.90 %    Basophils 0.90 0.00 - 1.80 %    Nucleated RBC 0.30 /100 WBC    Neutrophils (Absolute) 12.04 (H) 1.82 - 7.42 K/uL    Lymphs (Absolute) 1.95 1.00 - 4.80 K/uL    Monos (Absolute) 1.95 (H) 0.00 - 0.85 K/uL    Eos (Absolute) 0.30 0.00 - 0.51 K/uL    Baso (Absolute) 0.16 (H) 0.00 - 0.12 K/uL    NRBC (Absolute) 0.05 K/uL    Hypochromia 1+     Anisocytosis 1+     Macrocytosis 1+     Microcytosis 1+    COMP METABOLIC PANEL    Collection Time: 10/19/20  5:50 AM   Result Value Ref Range    Sodium 120 (L) 135 - 145 mmol/L    Potassium 5.6 (H) 3.6 - 5.5 mmol/L    Chloride 95 (L) 96 - 112 mmol/L    Co2 11 (L) 20 - 33 mmol/L    Anion Gap 14.0 7.0 - 16.0    Glucose 237 (H) 65 - 99 mg/dL    Bun 90 (HH) 8 - 22 mg/dL    Creatinine 4.51 (H) 0.50 - 1.40 mg/dL    Calcium 6.3 (LL) 8.5 - 10.5 mg/dL    AST(SGOT) 303 (H) 12 - 45 U/L    ALT(SGPT) 79 (H) 2 - 50 U/L    Alkaline Phosphatase 105 (H)  30 - 99 U/L    Total Bilirubin 17.0 (H) 0.1 - 1.5 mg/dL    Albumin 1.3 (L) 3.2 - 4.9 g/dL    Total Protein 3.6 (L) 6.0 - 8.2 g/dL    Globulin see below 1.9 - 3.5 g/dL    A-G Ratio see below g/dL   LIPASE    Collection Time: 10/19/20  5:50 AM   Result Value Ref Range    Lipase 577 (H) 11 - 82 U/L   PROTHROMBIN TIME (INR)    Collection Time: 10/19/20  5:50 AM   Result Value Ref Range    PT 31.4 (H) 12.0 - 14.6 sec    INR 2.92 (H) 0.87 - 1.13   APTT    Collection Time: 10/19/20  5:50 AM   Result Value Ref Range    APTT 94.4 (H) 24.7 - 36.0 sec   COD (ADULT)    Collection Time: 10/19/20  5:50 AM   Result Value Ref Range    ABO Grouping Only A     Rh Grouping Only POS     Antibody Screen-Cod NEG    LACTIC ACID    Collection Time: 10/19/20  5:50 AM   Result Value Ref Range    Lactic Acid 5.1 (HH) 0.5 - 2.0 mmol/L   ESTIMATED GFR    Collection Time: 10/19/20  5:50 AM   Result Value Ref Range    GFR If  18 (A) >60 mL/min/1.73 m 2    GFR If Non African American 15 (A) >60 mL/min/1.73 m 2   DIFFERENTIAL MANUAL    Collection Time: 10/19/20  5:50 AM   Result Value Ref Range    Bands-Stabs 6.00 0.00 - 10.00 %    Metamyelocytes 0.90 %    Myelocytes 6.00 %    Manual Diff Status PERFORMED    PERIPHERAL SMEAR REVIEW    Collection Time: 10/19/20  5:50 AM   Result Value Ref Range    Peripheral Smear Review see below    PLATELET ESTIMATE    Collection Time: 10/19/20  5:50 AM   Result Value Ref Range    Plt Estimation Normal    MORPHOLOGY    Collection Time: 10/19/20  5:50 AM   Result Value Ref Range    RBC Morphology Present     Polychromia 1+     Poikilocytosis 1+     Schistocytes 1+     Echinocytes 1+    MASSIVE TRANSFUSION    Collection Time: 10/19/20  5:53 AM   Result Value Ref Range    Component R       R99                 Red Cells, LR       [id number]   issued       10/19/20   05:57      Product Type R99     Dispense Status issued     Unit Number (Barcoded) [id number]     Product Code (Barcoded) M8112M64      Blood Type (Barcoded) 5100     Component R       R99                 Red Cells, LR       [id number]   issued       10/19/20   05:57      Product Type R99     Dispense Status issued     Unit Number (Barcoded) [id number]     Product Code (Barcoded) E9533E10     Blood Type (Barcoded) 5100     Component R       R99                 Red Cells, LR       [id number]   issued       10/19/20   05:57      Product Type R99     Dispense Status issued     Unit Number (Barcoded) [id number]     Product Code (Barcoded) F1684H16     Blood Type (Barcoded) 5100     Component R       R99                 Red Cells, LR       [id number]   issued       10/19/20   05:57      Product Type R99     Dispense Status issued     Unit Number (Barcoded) [id number]     Product Code (Barcoded) Q5071K63     Blood Type (Barcoded) 5100     Component F       TA2                 Thawed Plasma 2     [id number]   issued       10/19/20   05:57      Product Type Thawed Apheresis Plasma 2nd Cont     Dispense Status issued     Unit Number (Barcoded) [id number]     Product Code (Barcoded) Q4009G08     Blood Type (Barcoded) 8400     Component F       TA4                 Thawed Plasma 4     [id number]   issued       10/19/20   05:57      Product Type Thawed Apheresis Plasma 4th Cont     Dispense Status issued     Unit Number (Barcoded) [id number]     Product Code (Barcoded) L6467D32     Blood Type (Barcoded) 8400     Component F       TA3                 Thawed Plasma 3     [id number]   issued       10/19/20   05:57      Product Type Thawed Apheresis Plasma 3rd Cont     Dispense Status issued     Unit Number (Barcoded) [id number]     Product Code (Barcoded) L8992I91     Blood Type (Barcoded) 8400     Component F       TA1                 Thawed Plasma 1     [id number]   issued       10/19/20   05:57      Product Type Thawed Apheresis Plasma 1st Cont     Dispense Status issued     Unit Number (Barcoded) [id number]      Product Code (Barcoded) J0991M37     Blood Type (Barcoded) 8400     Component P       P1                  Plt Pheresis        [id number]   issued       10/19/20   05:57      Product Type P1     Dispense Status issued     Unit Number (Barcoded) [id number]     Product Code (Barcoded) V1750Y20     Blood Type (Barcoded) 6200    COVID/SARS CoV-2 PCR    Collection Time: 10/19/20  5:55 AM    Specimen: Nasopharyngeal; Respirate   Result Value Ref Range    COVID Order Status Received    SARS-COV Antigen SAMANTHA    Collection Time: 10/19/20  5:55 AM   Result Value Ref Range    SARS-CoV-2 Source Nasal Swab     SARS-COV ANTIGEN SAMANTHA NotDetected Not-Detected       Imaging  DX-CHEST-PORTABLE (1 VIEW)    (Results Pending)   CT-ABDOMEN-PELVIS WITH    (Results Pending)       Assessment/Plan  * Hemorrhagic shock (HCC)  Assessment & Plan  Massive upper GI bleed  Suspect esophageal and/or gastric varices  1: 1: 1 transfusion strategies are ongoing  Continue Levophed infusion  Calcium supplementation  [place] tube placed, with apparent hemostasis  CT portal venous system for possible emergency TIPS placement.  Dr. Pro, IR consulting    Acute respiratory failure (HCC)  Assessment & Plan  Intubated for airway protection and shock.  Lung protective ventilation strategies  Titrate ventilator prescription to optimize oxygenation, ventilation, and acid base balance.      End stage liver disease (HCC)  Assessment & Plan  Actively drinking as of 10/4/2020  Meld-38, 54% 90-day mortality  Continue Protonix infusion  Continue octreotide infusion  Gastroenterology consulted, currently too critically ill to undergo endoscopy.    Hyponatremia- (present on admission)  Assessment & Plan  Hypervolemia hyponatremia with associated liver failure  Intravascular volume depletion.    Hyperkalemia  Assessment & Plan  Most likely due to shock and metabolic acidosis.  Correct underlying physiology.      Discussed patient condition and risk of morbidity  and/or mortality with RN, RT, Pharmacy, Rehabilitation nydia and Dr. Calderón      The patient remains critically ill.  Actively transfusing blood products for hemorrhagic shock.  I have assessed and reassessed the respiratory status and made ventilator adjustments based upon arterial blood gas analysis, ventilator waveforms and airway mechanics.  I have assessed and reassessed the blood pressure, hemodynamics, cardiovascular status. This patient remains at high risk for worsening cardiopulmonary dysfunction and death without the above critical care interventions.    Critical care time 110 minutes in directly providing and coordinating critical care and extensive data review.  No time overlap and excludes procedures.

## 2020-10-19 NOTE — OR SURGEON
Immediate Post OP Note    PreOp Diagnosis: GI bleed    PostOp Diagnosis: massive UGI bleeding from GE junction region.  ~5 bands placed.    Procedure(s):  GASTROSCOPY    Surgeon(s):  Eduardo Shabazz M.D.    Anesthesiologist/Type of Anesthesia:  No anesthesia staff entered./* No anesthesia type entered *    Surgical Staff:  Endoscopy Technician: (Unknown)  Endoscopy Nurse: (Unknown)    Specimens removed if any:  * No specimens in log *    Estimated Blood Loss: large    Findings: As above.      Complications: none    Plan: Emergent TIPS if available.  If not, then continue aggressive correction of INR, platelets, Hgb.  Continue IV Octreotide/PPI.  Poor prognosis.  Will follow.         10/19/2020 3:04 PM Eduardo Shabazz M.D.

## 2020-10-19 NOTE — ASSESSMENT & PLAN NOTE
Actively drinking as of 10/4/2020  Meld-38, 54% 90-day mortality  Continue Protonix infusion  Continue octreotide infusion  Esophageal varices at GE junction status post 6 band deployment  Not a TI PS candidate

## 2020-10-19 NOTE — ASSESSMENT & PLAN NOTE
Intubated for airway protection and shock.  Lung protective ventilation strategies  Titrate ventilator prescription to optimize oxygenation, ventilation, and acid base balance.

## 2020-10-20 PROBLEM — K92.2 GI BLEED: Status: ACTIVE | Noted: 2020-01-01

## 2020-10-20 PROBLEM — K76.7 HEPATORENAL SYNDROME (HCC): Status: ACTIVE | Noted: 2020-01-01

## 2020-10-20 NOTE — RESPIRATORY CARE
Extubation    Events/Summary/Plan: Pt extubated to comfort care per MD order (10/20/20 8850)

## 2020-10-20 NOTE — PROGRESS NOTES
Update:  Jose Dejon Yee's brother and surrogate decision maker has arrived at the bedside.  We discussed his brothers current clinical condition, treatment plan and prognosis.  He says that he and his family feel ready for this day.  They do not wish to prolong life support.  Jose is requested to transition to comfort measures.    Jaime Tinajero MD  Critical care medicine

## 2020-10-20 NOTE — PROCEDURES
DATE OF SERVICE:  10/19/2020    INDICATION FOR THE PROCEDURE:  Massive upper GI bleeding, not felt to be   controlled adequately with maximal medical therapy, including placement of   [place] tube.    CONSENT:  Informed consent was obtained from the patient's significant other   telephonically.    PROCEDURE DESCRIPTION:  The patient had already been endotracheally intubated.    In the supine position, the upper endoscope has already been fitted with   banding equipment.  The Blakemore tube was deflated both the gastric and   esophageal balloons and removed, with the scope was immediately advancing into   the patient's mouth and esophagus.  The entire esophagus was immediately   already filled with blood by the time the upper endoscope was inserted.    Aggressive suctioning and rinsing was performed, and it did appear that the   quickest reaccumulation of blood was present at the GE junction region.  Blood   accumulation was so rapid that a specific site of bleeding could not be   definitively identified despite repeated rinsing and suctioning over the   entirety of the procedure.  Scope was advanced into the proximal stomach, and   a giant placental clot mixed with very fresh blood was present filling what   appeared to be the entirety of the stomach.  The prefitted  was then   used to band in the location where it appeared that blood was accumulating the   fastest, with initially good purchase of mucosa.  Approximately 4 bands were   placed.  Bleeding seemed to slow to some degree, though certainly not   completely, there continued to be fairly rapid accumulation of blood.    Additional banding was attempted; however, there were some misfires, thus the   scope was then removed and a second banding apparatus was affixed to the   scope.  The scope was then replaced back into the distal esophagus and GE   junction, and additional bands were placed.  The scope was then finally   removed.    COMPLICATIONS:  No  complications other than ongoing bleeding occurred.    ASSESSMENT AND PLAN:  Bleeding at the area of the gastroesophageal junction,   almost certainly due to variceal bleeding either esophageal, or potentially   gastric varices, though gastric varices seem somewhat less likely based on the   presentation of bleeding seen on endoscopy.    RECOMMENDATION:  As the patient seems to be continuing to bleed despite   endoscopic treatment, an emergent TIPS would be recommended.  If this is not   available, then would continue to aggressively correct INR, platelets, and   hemoglobin.  The patient's prognosis overall is extremely poor.  We will   continue to follow him along in the hospital with you.       ____________________________________     SHAHRAM MCCONNELL MD    WP / NTS    DD:  10/19/2020 15:17:48  DT:  10/19/2020 17:18:40    D#:  [id number]  Job#:  [id number]

## 2020-10-20 NOTE — CARE PLAN
Adult Ventilation Update    Total Vent Days: 1  Pressure control   Pcontrol 30/ 35/ +10/100%    Patient Lines/Drains/Airways Status    Active Airway     Name: Placement date: Placement time: Site: Days:    Airway ETT Oral 7.5  10/19/20   --   Oral  less than 1               Plateau Pressure: 36(md em) (10/19/20 1420)  Static Compliance (ml / cm H2O): 20.1 (10/19/20 1420)    Patient failed trials because of Barriers to Wean: Severe Acidosis as defined by pH <7.2;Evidence of active shock,hemmorhage or sepsis (10/19/20 1420)  Barriers to SBT    Length of Weaning Trial      Events/Summary/Plan: Peep increased to 12 during EGD  (10/19/20 1420)

## 2020-10-20 NOTE — PROGRESS NOTES
RCC    Called by RN for worsening liver functions, AST now greater than 7000 and ALT 2879 with bilirubin 17.2  Anion gap increasing his bicarb is falling, ABG 7.32/23/55, B/c 77/4.24  Fluid balance reviewed, urine output reduced  Patient does not appear to be a good dialysis candidate  Also called for hypoglycemia, D50 administered times several  Start D10 infusion at 50 mL/HR and continue hypoglycemia protocols with every hour blood sugar testing  And need additional D50  Obtain ammonia level, may need lactulose and rifaximin  Also called for low fibrinogen, will transfuse cryo with goal level > 200  And need further transfusions  Palliative care consultation, prognosis is deemed to be grim after reviewing chart

## 2020-10-20 NOTE — PROGRESS NOTES
Critical Care Progress Note    Date of admission  10/19/2020    Chief Complaint  Massive upper GI bleed    Hospital Course    35 y.o. male who presented 10/19/2020 with history of end-stage alcoholic liver disease.  He presented to outside hospital complaining of vomiting for 3 weeks and he has now vomiting bright red blood.  He was intubated for airway protection.  He received 2 units of packed red blood cells and 1 unit of FFP resuscitation.  A Levophed infusion was initiated.  He was transferred to [facility] for further evaluation and management.  When he arrived he had copious bright red blood pouring out of his mouth and nose, and was requiring high-dose Levophed to maintain his blood pressure.  Massive transfusion was initiated.  Dr. Calderón and myself emergently placed a [place] tube.  Interventional radiology was consulted for possible TIPS.  Patient was deemed not a good TI PS candidate.  10/18-EGD revealed large esophageal varices from the GE junction.  5 bands placed.      Interval Problem Update  Reviewed last 24 hour events:   - Low fibrinogen, cryoprecipitate transfused              - Tm: Afebrile              - HR: 94-1 04              - SBP: 80-90s              - Neuro:               - GI: NPO              - UOP: 100ml              - Esqueda: Yes              - Lines: CVC,               - PPx: PPPI, SCD              - CXR (personally reviewed):               - Antibiotic Day Unasyn   - SAT/SBT contraindicated   - Mobility contraindicated    Infusions:  D10 - Bicarb  Norepinephrine  Octreotide  Vasopressin  Dexmedetomidine    WBC 20, platelets 107 hemoglobin 8.2-->7.7  Sodium 135 potassium 4.8 CO2 12 BUN 77 creatinine 4.24 AST greater than 7000 ALT 2879 albumin 1.6    Review of Systems  Review of Systems   Unable to perform ROS: Intubated        Vital Signs for last 24 hours   Temp:  [29.6 °C (85.2 °F)-37.8 °C (100 °F)] 37.8 °C (100 °F)  Pulse:  [] 101  Resp:  [16-41] 26  BP:  ()/(45-79) 126/59  SpO2:  [87 %-100 %] 87 %    Hemodynamic parameters for last 24 hours       Respiratory Information for the last 24 hours  Vent Mode: PCMV  Rate (breaths/min): 26  Vt Target (mL): 420  PEEP/CPAP: 14  MAP: 22  Control VTE (exp VT): 394    Physical Exam   Physical Exam  Constitutional:       Appearance: He is ill-appearing and toxic-appearing.   HENT:      Mouth/Throat:      Mouth: Mucous membranes are moist.   Eyes:      General: Scleral icterus present.      Comments: Pupils nonreactive   Cardiovascular:      Rate and Rhythm: Regular rhythm.      Heart sounds: No murmur. No friction rub. No gallop.    Pulmonary:      Breath sounds: Rhonchi present.   Abdominal:      General: There is no distension.      Palpations: Abdomen is soft.   Musculoskeletal:      Right lower leg: Edema present.      Left lower leg: Edema present.   Skin:     Coloration: Skin is jaundiced.   Neurological:      Comments: Obtunded, nonresponsive to verbal or noxious stimuli         Medications  Current Facility-Administered Medications   Medication Dose Route Frequency Provider Last Rate Last Dose   • 10% dextrose infusion   Intravenous Continuous Jaime Tinajero M.D. 100 mL/hr at 10/20/20 0917     • albumin human 25% solution 25 g  25 g Intravenous Q6HRS Alpesh Burks M.D.   Stopped at 10/20/20 0645   • MD ALERT...adult comfort care   Other PRN Jaime Tinajero M.D.       • atropine 1 % ophthalmic solution 2 Drop  2 Drop Sublingual Q4HRS PRN Jaime Tinajero M.D.       • morphine (pf) 10 mg/mL injection 10 mg  10 mg Intravenous Q HOUR PRN Jaime Tinajero M.D.       • LORazepam (ATIVAN) 2 MG/ML oral conc 1 mg  1 mg Sublingual Q HOUR PRN Jaime Tinajero M.D.        Or   • LORazepam (ATIVAN) injection 1 mg  1 mg Intravenous Q HOUR PRN Jaime Tinajero M.D.       • scopolamine (TRANSDERM-SCOP) patch 1 Patch  1 Patch Transdermal Q72HRS Jaime Tinajero M.D.       • morphine (pf) 10 mg/mL injection 10 mg  10 mg Intravenous  Once Jaime Tinajero M.D.       • norepinephrine (Levophed) infusion 8 mg/250 mL (premix)  0-30 mcg/min Intravenous Continuous Jaime Tinajero M.D. 56.3 mL/hr at 10/20/20 0831 30 mcg/min at 10/20/20 0831   • octreotide (SANDOSTATIN) 1,250 mcg in  mL Infusion  50 mcg/hr Intravenous Continuous Jaime Tinajero M.D. 10 mL/hr at 10/20/20 0612 50 mcg/hr at 10/20/20 0612   • Respiratory Therapy Consult   Nebulization Continuous RT Jaime Tinajero M.D.       • ipratropium-albuterol (DUONEB) nebulizer solution  3 mL Nebulization Q2HRS PRN (RT) Jaime Tinajero M.D.       • senna-docusate (PERICOLACE or SENOKOT S) 8.6-50 MG per tablet 2 Tab  2 Tab Enteral Tube BID Jaime Tinajero M.D.   Stopped at 10/19/20 1800    And   • polyethylene glycol/lytes (MIRALAX) PACKET 1 Packet  1 Packet Enteral Tube QDAY PRN Jaime Tinajero M.D.        And   • magnesium hydroxide (MILK OF MAGNESIA) suspension 30 mL  30 mL Enteral Tube QDAY PRN Jaime Tinajero M.D.        And   • bisacodyl (DULCOLAX) suppository 10 mg  10 mg Rectal QDAY PRN Jaime Tinajero M.D.       • lidocaine (XYLOCAINE) 1 % injection 1-2 mL  1-2 mL Tracheal Tube Q30 MIN PRN Jaime Tinajero M.D.       • fentaNYL (SUBLIMAZE) injection 100 mcg  100 mcg Intravenous Q15 MIN PRN Jaime Tinajero M.D.        And   • fentaNYL (SUBLIMAZE) injection 200 mcg  200 mcg Intravenous Q15 MIN PRN Jaime Tinajero M.D.        And   • dexmedetomidine (PRECEDEX) 400 mcg/100mL NS premix infusion  0-1.5 mcg/kg/hr Intravenous Continuous Jaime Tinajero M.D.   Stopped at 10/20/20 0900   • vasopressin (VASOSTRICT) 20 Units in  mL Infusion  0.03 Units/min Intravenous Continuous Jaime Tinajero M.D. 9 mL/hr at 10/20/20 0834 0.03 Units/min at 10/20/20 0834   • phenylephrine (SOTERO-SYNEPHRINE) 40 mg in  mL Infusion  0-300 mcg/min Intravenous Continuous Roderick Young D.O. 112.5 mL/hr at 10/20/20 0914 300 mcg/min at 10/20/20 0914   • fentaNYL (SUBLIMAZE) 50 mcg/mL in 50mL (Continuous Infusion)    Intravenous Continuous Roderick Young D.O. 2 mL/hr at 10/20/20 0511 100 mcg/hr at 10/20/20 0511   • phytonadione (AQUA-MEPHYTON) 10 mg in NS 50 mL IVPB  10 mg Intravenous DAILY Roderick Young D.O.   Stopped at 10/20/20 0729   • pantoprazole (PROTONIX) 80 mg in  mL Infusion  8 mg/hr Intravenous Continuous Roderick Young D.O. 25 mL/hr at 10/20/20 0834 8 mg/hr at 10/20/20 0834   • sodium bicarbonate 150 mEq in D5W infusion (premix)   Intravenous Continuous Roderick Young D.O. 150 mL/hr at 10/20/20 0942 150 mL/hr at 10/20/20 0942   • ampicillin/sulbactam (UNASYN) 3 g in  mL IVPB  3 g Intravenous Q6HRS Roderick Young D.O.   Stopped at 10/20/20 0624   • dextrose 50% (D50W) injection 50 mL  50 mL Intravenous Q15 MIN PRN Piter Velasquez M.D.   50 mL at 10/20/20 1043       Fluids    Intake/Output Summary (Last 24 hours) at 10/20/2020 1126  Last data filed at 10/20/2020 1000  Gross per 24 hour   Intake 20791.19 ml   Output 50867 ml   Net 5937.19 ml       Laboratory  Recent Labs     10/19/20  1606 10/19/20  1742 10/20/20  0301   ISTATAPH 6.947* 7.125* 7.313*   ISTATAPCO2 70.7* 46.7* 23.0*   ISTATAPO2 67 48* 55*   ISTATATCO2 18* 17* 12*   NBRVZFE5CAT 78* 70* 87*   ISTATARTHCO3 15.4* 15.3* 11.7*   ISTATARTBE -15* -13* -13*   ISTATTEMP 87.7 F 90.5 F 98.4 F   ISTATFIO2 100 100 100   ISTATSPEC Arterial Arterial Arterial   ISTATAPHTC 7.018* 7.183* 7.315*   [id number] 45* 35* 55*         Recent Labs     10/19/20  1445 10/19/20  1810 10/20/20  0309   SODIUM 138 136 135   POTASSIUM 5.4 5.3 4.8   CHLORIDE 102 101 98   CO2 20 14* 12*   BUN 80* 79* 77*   CREATININE 4.10* 4.04* 4.24*   MAGNESIUM  --   --  2.2   PHOSPHORUS  --   --  7.9*   CALCIUM 8.0* 7.4* 7.2*     Recent Labs     10/19/20  0550 10/19/20  0820 10/19/20  1445 10/19/20  1810 10/20/20  0309   ALTSGPT 79* 118*  --   --  2879*   ASTSGOT 303* 469*  --   --  >7000*   ALKPHOSPHAT 105* 83  --   --  153*   TBILIRUBIN 17.0* 12.8*  --   --  17.2*   LIPASE 577*  --   --   --   --     GLUCOSE 237* 79 119* 62* 80     Recent Labs     10/19/20  0550 10/19/20  0820 10/19/20  1227  10/20/20  0205 10/20/20  0309 10/20/20  0620 10/20/20  1013   WBC 17.6* 11.1* 12.4*   < > 20.3*  --  28.0* 33.1*   NEUTSPOLYS 62.40  --  64.30  --  64.20  --   --   --    LYMPHOCYTES 11.10*  --  20.00*  --  18.30*  --   --   --    MONOCYTES 11.10  --  3.50  --  2.80  --   --   --    EOSINOPHILS 1.70  --  0.90  --  0.00  --   --   --    BASOPHILS 0.90  --  0.90  --  1.80  --   --   --    ASTSGOT 303* 469*  --   --   --  >7000*  --   --    ALTSGPT 79* 118*  --   --   --  2879*  --   --    ALKPHOSPHAT 105* 83  --   --   --  153*  --   --    TBILIRUBIN 17.0* 12.8*  --   --   --  17.2*  --   --     < > = values in this interval not displayed.     Recent Labs     10/19/20  0550 10/19/20  0820  10/19/20  1250  10/19/20  1810  10/20/20  0205 10/20/20  0620 10/20/20  1013   RBC 1.73* 1.79*   < >  --    < > 2.59*   < > 2.58* 2.45* 2.32*   HEMOGLOBIN 5.4* 5.6*   < >  --    < > 8.0*   < > 8.2* 7.7* 7.3*   HEMATOCRIT 16.1* 18.3*   < >  --    < > 24.4*   < > 22.7* 21.8* 20.6*   PLATELETCT 222 143*   < >  --    < > 90*   < > 107* 112* 110*   PROTHROMBTM 31.4* 32.0*  --  27.8*  --  17.4*  --   --   --   --    APTT 94.4*  --   --   --   --  63.6*  --   --   --   --    INR 2.92* 2.99*  --  2.51*  --  1.38*  --   --   --   --     < > = values in this interval not displayed.       Imaging  X-Ray:  I have personally reviewed the images and compared with prior images. and My impression is: Endotracheal tube 2 cm above the nadeem.  There are diffuse hazy opacities worse on the right than left.  Low lung volumes.  No pneumothorax cannot exclude underlying effusion or infiltrates    Assessment/Plan  * Hemorrhagic shock (HCC)  Assessment & Plan  Status post massive upper GI bleed due to esophageal and gastric varices  TEG guided transfusion strategies  Continue Levophed, vasopressin, octreotide infusions        Acute respiratory failure  (HCC)  Assessment & Plan  Intubated for airway protection and shock.  Lung protective ventilation strategies  Titrate ventilator prescription to optimize oxygenation, ventilation, and acid base balance.      End stage liver disease (HCC)  Assessment & Plan  Actively drinking as of 10/4/2020  Meld-38, 54% 90-day mortality  Continue Protonix infusion  Continue octreotide infusion  Esophageal varices at GE junction status post 6 band deployment  Not a TI PS candidate    Hyponatremia- (present on admission)  Assessment & Plan  Hypervolemia hyponatremia with associated liver failure  Intravascular volume depletion.    Hepatorenal syndrome (HCC)  Assessment & Plan  Not a dialysis candidate due to hypotension on multiple vasopressors    GI bleed  Assessment & Plan  Continue Protonix, octreotide      Hyperkalemia  Assessment & Plan  Most likely due to shock and metabolic acidosis.  Correct underlying physiology.       VTE:  Contraindicated  Ulcer: PPI  Lines: Central Line  Ongoing indication addressed    I have performed a physical exam and reviewed and updated ROS and Plan today (10/20/2020). In review of yesterday's note (10/19/2020), there are no changes except as documented above.     Discussed patient condition and risk of morbidity and/or mortality with Family, RN, RT and Pharmacy       The patient remains critically ill.  I have assessed and reassessed the respiratory status and made ventilator adjustments based upon arterial blood gas analysis, ventilator waveforms and airway mechanics.  I have assessed and reassessed the blood pressure, hemodynamics, cardiovascular status.  Actively titrating Levophed and vasopressin.  Patient is requiring ongoing transfusions this patient remains at high risk for worsening cardiopulmonary dysfunction and death without the above critical care interventions.    Critical care time 60 minutes in directly providing and coordinating critical care and extensive data review.  No time  overlap and excludes procedures.

## 2020-10-20 NOTE — PROCEDURES
Central Line Insertion    Date/Time: 10/19/2020 5:12 PM  Performed by: Roderick Young D.O.  Authorized by: Roderick Young D.O.     Consent:     Consent obtained:  Emergent situation  Universal protocol:     Site/side marked: no      Immediately prior to procedure, a time out was called: no    Pre-procedure details:     Hand hygiene: Hand hygiene performed prior to insertion      Sterile barrier technique: All elements of maximal sterile technique followed      Skin preparation:  2% chlorhexidine    Skin preparation agent: Skin preparation agent completely dried prior to procedure    Sedation:     Sedation type:  Deep  Anesthesia:     Anesthesia method:  None  Procedure details:     Location:  L femoral    Patient position:  Flat    Procedural supplies:  Triple lumen    Catheter size:  7 Fr    Landmarks identified: yes      Ultrasound guidance: yes      Sterile ultrasound techniques: Sterile gel and sterile probe covers were used      Number of attempts:  1    Successful placement: yes    Post-procedure details:     Post-procedure:  Dressing applied and line sutured    Assessment:  Blood return through all ports, free fluid flow, no pneumothorax on x-ray and placement verified by x-ray    Patient tolerance of procedure:  Tolerated well, no immediate complications

## 2020-10-20 NOTE — PROGRESS NOTES
In ED at approximately 1200 to pick patient up. Patient on 40 of levo at this time. Pressures 70s systolic. ED RN Jean notified and vasopressin ordered. Vasopressin started in ED. Pressures continue to be <90s systolic. 5mL phenylephrine IV push given on way to RICU.  Blakemore tube in place at 30 at lip. Traction held during transfer.    Patient arrived in RICU at approximately 1225. MD Young at bedside. ABG obtained and labs obtained. See labs in EMR.  1250:  BS 20s. 2 amps D50 given. D/t ABG values, 3 amps bicarb given IV push  2 unit PRBCs given at this time  FFP x2  1300: Lactate 7.7, Hannah aware  1313:   1320: Calcium Cl and Vitamin K given, FiO2 70%, PEEP 10  1330: Repeat ABG obtained, 2 amps bicarb given  1345: GI at bedside to begin EGD  1400: Blakemore tube deflated and removed.  1414: Patient desatting on monitor and pulling low Tidal volumes on ventilator.  Patient bagged. Air added to cuff on ETT. Patient now 100% FiO2, 12 PEEP  1420: PRBC x3, FFP x1  1500: EGD finished. Did not replace blakemore tube. Patient continues to have bloody output from mouth.  1600: PRBC x2, FFP x3  1610: Platelet x1  1615: 2 amp bicarb given  1625: Bronchoscopy started  1626: timeout  1628: 3mL propofol  1629: 100mg fent  1640: Cryo x2  1715: MD notified of Na increase from 126 to 138  Significant other at bedside at 1500. Updated on patient.    See MAR for meds administered and titrations done

## 2020-10-20 NOTE — DISCHARGE SUMMARY
Death Summary    Cause of Death  Liver failure due to alcoholic cirrhosis  due to ongoing alcohol abuse    Comorbid Conditions at the Time of Death  Principal Problem:    Hemorrhagic shock (HCC) POA: Unknown  Active Problems:    Acute respiratory failure (HCC) POA: Unknown    Hyponatremia POA: Yes      Overview: improving    End stage liver disease (HCC) POA: Unknown    Hyperkalemia POA: Unknown    GI bleed POA: Unknown    Hepatorenal syndrome (HCC) POA: Unknown  Resolved Problems:    * No resolved hospital problems. *      History of Presenting Illness and Hospital Course  35 y.o. male who presented 10/19/2020 with history of end-stage alcoholic liver disease.  He presented to outside hospital complaining of vomiting for 3 weeks and he has now vomiting bright red blood.  He was intubated for airway protection.  He received 2 units of packed red blood cells and 1 unit of FFP resuscitation.  A Levophed infusion was initiated.  He was transferred to [facility] for further evaluation and management.  When he arrived he had copious bright red blood pouring out of his mouth and nose, and was requiring high-dose Levophed to maintain his blood pressure.  Massive transfusion was initiated.  Dr. Calderón and myself emergently placed a [place] tube.  Interventional radiology was consulted for possible TIPS. 10/19-EGD revealed large esophageal varices from the GE junction.  5 bands placed.   10/20- Jose YeeDejon's brother and surrogate decision maker has arrived at the bedside.  We discussed his brothers current clinical condition, treatment plan and prognosis.  He says that he and his family feel ready for this day.  They do not wish to prolong life support.  Jose is requested to transition to comfort measures.    Death Date: 10/20/20   Death Time: 1150      Pronounced By (MD): Dahra Olivera  Pronounced By (RN1): David Her

## 2020-10-20 NOTE — PROCEDURES
BRONCHOSCOPY PROCEDURE NOTE      Date: 10/19/2020  Time: 5:15 PM    Time out: performed. Name, MRN, allergy and procedure were confirmed.     Indication: acute hypoxic respiratory failure     Consent: not done, emergent situation     Procedure: Bronchoscopy with therapeutic aspiration of secretions    Sedation: precedex, fentanyl, 1% lidocaine    Findings:  Respiratory therapy and nursing at bedside throughout procedure. Patient provided sedation and analgesia throughout the procedure. Placed on full ventilator support with an FiO2 of 100% during procedure. Using a fiberoptic bronchoscope, trachea entered via ET tube. 5 mL of local anesthetic sprayed at the nadeem achieving appropriate comfort level for patient. Airways visualized directly and careful inspection of airway was accomplished.     Thin bloody-tinged secretions noted throughout from trachea, bilateral main stem, RUL, RLL, lingula, JUNO, LLL. No yesenia blood noted, no blood clots, no obstructions. I lavaged, and suctioned the bloody secretions as much as I could. All secretions were suctioned and cleared.     Specimen sent to microbiology/pathology: none    Complications:   Patient tolerated procedure well without any difficulties and left in care of bedside nurse/RT.     Estimated blood loss: none    For the above procedure I also performed the conscious sedation and was directly involved with administration of medication, monitoring airway, vital signs, preventing complications.  Administered total 5mg propofol in titration fashion until achieving a level of moderate procedural sedation.      Patient tolerated procedure well without complications from sedation and was left in the care of his bedside nurse and respiratory therapy. Procedural sedation time equals 9 minutes which was separate from procedure time as described above.  Start time: 1626  Stop time: 1635    Roderick Young D.O.  Critical Care Medicine

## 2020-10-20 NOTE — PROGRESS NOTES
1140: pt transition to comfort care, extubated by RT.     1150: TOD. Brother at bedside, notified.

## 2020-10-20 NOTE — PROGRESS NOTES
Gastroenterology Progress Note     Author: Eduin Richard M.D.   Date & Time Created: 10/20/2020 12:56 PM    Chief Complaint:  Massive Upper GI bleeding    Interval History:  S/p bedside EGD with banding x5 on 10/19/20  Nursing reports no gross bleeding since yesterday  Still requiring cryo transfusions     Review of Systems:  Review of Systems   Unable to perform ROS: Intubated       Physical Exam:  Physical Exam   Constitutional: He is sedated and intubated.   Generalized jaundice   Pulmonary/Chest: He is intubated.   Abdominal: Soft. He exhibits distension. There is no abdominal tenderness.       Labs:  Recent Labs     10/19/20  1606 10/19/20  1742 10/20/20  0301   ISTATAPH 6.947* 7.125* 7.313*   ISTATAPCO2 70.7* 46.7* 23.0*   ISTATAPO2 67 48* 55*   ISTATATCO2 18* 17* 12*   [id number] 78* 70* 87*   ISTATARTHCO3 15.4* 15.3* 11.7*   ISTATARTBE -15* -13* -13*   ISTATTEMP 87.7 F 90.5 F 98.4 F   ISTATFIO2 100 100 100   ISTATSPEC Arterial Arterial Arterial   ISTATAPHTC 7.018* 7.183* 7.315*   [id number] 45* 35* 55*         Recent Labs     10/19/20  1445 10/19/20  1810 10/20/20  0309   SODIUM 138 136 135   POTASSIUM 5.4 5.3 4.8   CHLORIDE 102 101 98   CO2 20 14* 12*   BUN 80* 79* 77*   CREATININE 4.10* 4.04* 4.24*   MAGNESIUM  --   --  2.2   PHOSPHORUS  --   --  7.9*   CALCIUM 8.0* 7.4* 7.2*     Recent Labs     10/19/20  0550 10/19/20  0820 10/19/20  1445 10/19/20  1810 10/20/20  0309   ALTSGPT 79* 118*  --   --  2879*   ASTSGOT 303* 469*  --   --  >7000*   ALKPHOSPHAT 105* 83  --   --  153*   TBILIRUBIN 17.0* 12.8*  --   --  17.2*   LIPASE 577*  --   --   --   --    GLUCOSE 237* 79 119* 62* 80     Recent Labs     10/19/20  0550 10/19/20  0820  10/19/20  1250  10/19/20  1810  10/20/20  0205 10/20/20  0620 10/20/20  1013   RBC 1.73* 1.79*   < >  --    < > 2.59*   < > 2.58* 2.45* 2.32*   HEMOGLOBIN 5.4* 5.6*   < >  --    < > 8.0*   < > 8.2* 7.7* 7.3*   HEMATOCRIT 16.1* 18.3*   < >  --    < > 24.4*   < > 22.7*  21.8* 20.6*   PLATELETCT 222 143*   < >  --    < > 90*   < > 107* 112* 110*   PROTHROMBTM 31.4* 32.0*  --  27.8*  --  17.4*  --   --   --   --    APTT 94.4*  --   --   --   --  63.6*  --   --   --   --    INR 2.92* 2.99*  --  2.51*  --  1.38*  --   --   --   --     < > = values in this interval not displayed.     Recent Labs     10/19/20  0550 10/19/20  0820 10/19/20  1227  10/20/20  0205 10/20/20  0309 10/20/20  0620 10/20/20  1013   WBC 17.6* 11.1* 12.4*   < > 20.3*  --  28.0* 33.1*   NEUTSPOLYS 62.40  --  64.30  --  64.20  --   --   --    LYMPHOCYTES 11.10*  --  20.00*  --  18.30*  --   --   --    MONOCYTES 11.10  --  3.50  --  2.80  --   --   --    EOSINOPHILS 1.70  --  0.90  --  0.00  --   --   --    BASOPHILS 0.90  --  0.90  --  1.80  --   --   --    ASTSGOT 303* 469*  --   --   --  >7000*  --   --    ALTSGPT 79* 118*  --   --   --  2879*  --   --    ALKPHOSPHAT 105* 83  --   --   --  153*  --   --    TBILIRUBIN 17.0* 12.8*  --   --   --  17.2*  --   --     < > = values in this interval not displayed.       Imaging:  DX-CHEST-PORTABLE (1 VIEW)   Final Result         1.  Pulmonary edema and/or infiltrates are identified, which are somewhat decreased since the prior exam.   2.  Cardiomegaly      DX-CHEST-LIMITED (1 VIEW)   Final Result         1.  Findings suggest some interval increase in consolidation and volume loss in the right lung which could indicate increasing consolidative atelectasis.      2.  Possible developing atelectasis of the left lung base.      3.  Endotracheal tube is noted with the tip at the level of the clavicles.      DX-CHEST-LIMITED (1 VIEW)   Final Result      1.  Right central catheter is not well seen, tip likely at the confluence of the brachiocephalic veins.   2.  No pneumothorax.   3.  Hypoinflation. Right basilar atelectasis versus consolidations.      OUTSIDE IMAGES-CT ABDOMEN /PELVIS   Final Result      OUTSIDE IMAGES-DX CHEST   Final Result      CT-ABDOMEN-PELVIS WITH   Final  Result         1.  Distended small bowel loops with reactive mucosal pattern, appearance suggests ileus and/or enteritis, may be secondary to changes of portal hypertension.   2.  Dependent consolidations of the bilateral lung bases, appearance suggests atelectasis, infiltrates not excluded   3.  Patchy right upper lobe opacities concerning for infiltrates   4.  Hepatomegaly with irregular hepatic contour, appearance favors changes of cirrhosis.   5.  Moderate large quantity of abdominal ascites   6.  Diffuse subcutaneous fat stranding suggests component of anasarca.      DX-CHEST-PORTABLE (1 VIEW)   Final Result         1.  Hazy bilateral pulmonary infiltrates   2.  Diminished lung volumes   3.  Cardiomegaly            Assessment:  Massive UGIB 2/2 acute decompensated liver failure    Plan:  1. Patient has now developed shock-liver type injury with large LFT increases. Ammonia increased as well. Hgb more stable after massive transfusion. PT/INR has improved indicating improving from blood loss standpoint, however with liver failure and high MELD prognosis remains extremely poor.  2. Now has been transitioned to comfort care and extubated per respiratory notes.  3. GI will sign off, please call if there are any more questions    Quality-Core Measures

## 2020-10-20 NOTE — CARE PLAN
Problem: Bowel/Gastric:  Goal: Normal bowel function is maintained or improved  Outcome: NOT MET  Note: GI bleed. Continues to bleed. Abd distended and semi firm. No bm this shift.     Problem: Fluid Volume:  Goal: Will maintain balanced intake and output  Outcome: NOT MET  Note: Continues to bleed despite interventions.     Problem: Urinary Elimination:  Goal: Ability to reestablish a normal urinary elimination pattern will improve  Outcome: NOT MET  Flowsheets (Taken 10/19/2020 1600)  Urinary Elimination: Oliguria

## 2020-10-20 NOTE — RESPIRATORY CARE
Ventilator Daily Summary    Vent Day #2    Ventilator settings changed this shift: PC25 RR26 +12 100%    Weaning trials:No    Respiratory Procedures: No    Plan: Continue current ventilator settings and wean mechanical ventilation as tolerated per physician orders.

## 2020-10-21 NOTE — PROGRESS NOTES
MELANI Dinero had opened encounter to engage with pt. Unfortunately, pt has passed away. Encounter will be signed and closed.

## (undated) DEVICE — SPEEDBAND SUPERVIEW SUPER 7

## (undated) DEVICE — SOD. CHL 10CC SYRINGE PREFILL - W/10 CC (30/BX)

## (undated) DEVICE — SYRINGE 3 CC 22 GA X 1-1/2 - NDL SAFETY (50/BX 8BX/CA)

## (undated) DEVICE — KIT CUSTOM PROCEDURE SINGLE FOR ENDO  (15/CA)

## (undated) DEVICE — BITE BLOCK ADULT 60FR (100EA/CA)